# Patient Record
Sex: MALE | Race: WHITE | Employment: OTHER | URBAN - METROPOLITAN AREA
[De-identification: names, ages, dates, MRNs, and addresses within clinical notes are randomized per-mention and may not be internally consistent; named-entity substitution may affect disease eponyms.]

---

## 2017-07-11 PROBLEM — I27.20 PULMONARY HYPERTENSION (HCC): Status: ACTIVE | Noted: 2017-07-11

## 2018-07-24 PROBLEM — R93.1 ABNORMAL ECHOCARDIOGRAM: Status: ACTIVE | Noted: 2018-07-24

## 2018-07-24 PROBLEM — I51.7 LVH (LEFT VENTRICULAR HYPERTROPHY): Status: ACTIVE | Noted: 2018-07-24

## 2018-07-24 PROBLEM — I51.7 RIGHT ATRIAL ENLARGEMENT: Status: ACTIVE | Noted: 2018-07-24

## 2019-08-08 ENCOUNTER — HOSPITAL ENCOUNTER (OUTPATIENT)
Dept: RADIATION ONCOLOGY | Age: 74
Discharge: HOME | End: 2019-08-08
Payer: MEDICARE

## 2019-08-08 DIAGNOSIS — C61 PROSTATE CANCER (HCC): ICD-10-CM

## 2019-08-08 PROCEDURE — 99213 OFFICE O/P EST LOW 20 MIN: CPT | Performed by: RADIOLOGY

## 2019-08-08 NOTE — CONSULTS
Avi Leon M.D. Stefan Garcia. Agatha Murcia, Ph.D., M.D., Tanisha Pollock M.D. Corey Yousif, Ph.D., M.D. Gurpreet Machado M.D.       2019    Patient: Lobito Montero   YOB: 1945       Dear Dr Clotilde Pace:    Thank you for referring Lobito Montero to me for evaluation. Below are the relevant portions of my assessment and plan of care. If you have questions, please do not hesitate to call me. I look forward to following this patient along with you. Sincerely,      Electronically signed by Gurpreet Machado MD on 19 at 5:54 PM      CC: Patient Care Team:  Dominic Cody MD as PCP - General (Hematology and Oncology)          RADIATION ONCOLOGY NEW PATIENT ASSESSMENT    Date of Service: 2019    Location:  [facility] Radiation Oncology,   [facility][note 3] [facility]   [phone]     Patient ID:   Lobito Montero  : 1945   MRN: [id number]    Lobito Montero is being seen today for an oncologic opinion at the request of Dr. Clotilde Pace.     Chief Complaint:  Cancer Staging  Prostate cancer [facility])  Staging form: Prostate, AJCC 8th Edition  - Clinical: No stage assigned - Unsigned  - Pathologic stage from 2019: Stage TANJA (pT3b, pN1, cM0, PSA: 19.3, Grade Group: 5) - Signed by Gurpreet Machado MD on 2019    Patient Active Problem List   Diagnosis Code    Coronary artery disease involving native heart without angina pectoris I25.10    PVD (peripheral vascular disease) (HCC) I73.9    Hyperlipidemia E78.5    Essential hypertension I10    Family history of abdominal aortic aneurysm Z82.49    Abdominal aortic aneurysm without rupture [facility] I71.4    Non-rheumatic tricuspid valve insufficiency I36.1    Non-rheumatic mitral regurgitation I34.0    Pulmonary hypertension (HCC) I27.20    Abnormal echocardiogram R93.1    Right atrial enlargement I51.7    LVH (left ventricular hypertrophy) I51.7    Prostate cancer ([facility] C61 0.05, May 2018 a PSA of 0.29, October 4, 2018 a PSA of 0.72, February 2019 a PSA of 1.92 5/23/2019 a PSA of 3.96. The patient had repeat imaging included a bone scan on 8/5/2019 which was negative. A CT of the abdomen and pelvis on the same date documented a 2.6 cm nodule in the lower pelvis together with an enlarged lymph node in the left iliac chain. He has started Casodex under the care of medical oncology with Melanie to follow. I do agree with  proceeding with an Auxmin PET. I agreed that the patient will benefit from a course of external beam radiation therapy to the pelvis and prostatic fossa. My plan is to increase that the boostt dose to the prostatic fossa to close to definitive therapy doses. I will also contact the interventional radiologist about the feasibility of up with thin fiducial markers in this nodule. Therefore the fiducials will be placed prior to the patient's simulation. We will therefore range for these procedures to be performed when the patient returns from his vacation. Electronically signed by Talbot Gottron, MD on 8/8/2019 at 5:54 PM  [address]    CC:  Patient Care Team:  Pasha Shepherd MD as PCP - General (Hematology and Oncology)     Drugs Prescribed:  New Prescriptions    No medications on file       Orders Placed:   No orders of the defined types were placed in this encounter.

## 2019-08-08 NOTE — PROGRESS NOTES
Referring Physician: [facility]      There were no vitals filed for this visit. :                Wt Readings from Last 1 Encounters:   07/26/19 216 lb (98 kg)        There is no height or weight on file to calculate BMI. Immunizations:    Influenza status:    [x]   Current   []   Patient declined    Pneumococcal status:  [x]   Current  []   Patient declined    Smoking Status:    [] Smoker - PPD:   [] Nonsmoker - Quit Date:               [x] Never a smoker       No chief complaint on file. Cancer Staging  No matching staging information was found for the patient. Prior Radiation Therapy? No   If yes, site treated:   Facility:                             Date:    Concurrent Chemo/radiation? No   If yes, start date:    Prior Chemotherapy? No   If yes    Facility:                             Date:    Prior Hormonal Therapy? Yes, lupron If yes   [facility]ility: [facility]                       Date: 2015    Head and Neck Cancer? No   If yes, please remind physician to place swallow study order and speech therapy order. Pacemaker/Defibulator/ICD:  No              Current Outpatient Medications   Medication Sig Dispense Refill    carvedilol (COREG) 3.125 MG tablet Take 1 tablet by mouth 2 times daily 180 tablet 3    amLODIPine (NORVASC) 5 MG tablet Take 1 tablet by mouth daily 90 tablet 3    clopidogrel (PLAVIX) 75 MG tablet Take 1 tablet by mouth daily 90 tablet 3    losartan (COZAAR) 25 MG tablet Take 1 tablet by mouth daily 90 tablet 3    rosuvastatin (CRESTOR) 5 MG tablet take 1 tablet by mouth once daily 90 tablet 3    Calcium-Magnesium 200-50 MG TABS Take 3 tablets by mouth daily      Coenzyme Q10 (EQL COQ10) 300 MG CAPS Take by mouth      Cholecalciferol (VITAMIN D3) 73952 UNITS CAPS Take by mouth      aspirin 81 MG tablet Take 81 mg by mouth daily       No current facility-administered medications for this encounter.         Past Medical History:   Diagnosis Date    Coronary atherosclerosis of unspecified type of vessel, native or graft 8/13    NASREEN LCx, occl PDA, 70% Diag - med Rx    Kidney stone 11/2016    Malignant neoplasm of prostate ([address]     Occlusion and stenosis of carotid artery without mention of cerebral infarction 9/13    R CEA    Other and unspecified hyperlipidemia        Past Surgical History:   Procedure Laterality Date    CAROTID ENDARTERECTOMY Right 9/13    CAROTID ENDARTERECTOMY Left 09/2016    CORONARY ANGIOPLASTY WITH STENT PLACEMENT  8/13    NASREEN LCx, occl PDA, 70% Diag    HERNIA REPAIR Right     x 2    LITHOTRIPSY  11/2016    PROSTATECTOMY  11/14       Family History   Problem Relation Age of Onset    Coronary Art Dis Other     Other Father         AAA       Social History     Socioeconomic History    Marital status:      Spouse name: Not on file    Number of children: Not on file    Years of education: Not on file    Highest education level: Not on file   Occupational History    Not on file   Social Needs    Financial resource strain: Not on file    Food insecurity:     Worry: Not on file     Inability: Not on file    Transportation needs:     Medical: Not on file     Non-medical: Not on file   Tobacco Use    Smoking status: Never Smoker    Smokeless tobacco: Never Used   Substance and Sexual Activity    Alcohol use: Yes     Comment: occasional     Drug use: No    Sexual activity: Not on file   Lifestyle    Physical activity:     Days per week: Not on file     Minutes per session: Not on file    Stress: Not on file   Relationships    Social connections:     Talks on phone: Not on file     Gets together: Not on file     Attends Anabaptist service: Not on file     Active member of club or organization: Not on file     Attends meetings of clubs or organizations: Not on file     Relationship status: Not on file    Intimate partner violence:     Fear of current or ex partner: Not on file     Emotionally abused: Not on file patient/family/caregiver on falls prevention     7 or   Higher High Risk 1. Place patient in easily observable treatment room  2. Patient attended at all times by family member or staff  3. Provide assistance as indicated for ambulation activities  4. Reorient confused/cognitively impaired patient  5. Call-light/bell within patient's reach  6. Chair/bed in low position, stretcher/bed with siderails up except when performing patient care activities  7. Educate patient/family/caregiver on falls prevention             Assessment/Plan: Patient was seen today for consultation. Dr. Faith Minaya notified and examined patient. Dr. Faith Minaya needs to speak to IR. Pt going to [facility] and will follow up after returning from vacation.          Helen Henry 8/8/2019 1:11 PM

## 2019-08-09 ENCOUNTER — TELEPHONE (OUTPATIENT)
Dept: RADIATION ONCOLOGY | Age: 74
End: 2019-08-09

## 2019-08-09 NOTE — TELEPHONE ENCOUNTER
Pt was seen in consult on 8/8/19 new order placed 8/9/19 for Axumin pet/ct. Pt prefers to use [facility] if services are available. I called [facility] was transferred to [phone] to schedule. Axumin scans only done on Wednesday @330p. Pt will be out of town for a few weeks returning arund 8/28. Pt scheduled for Axumin on 9/4/19 @330p with arrival time 3p @ [facility]. Order and records faxed to [phone] with confirmation. Pt notified with above information.

## 2019-08-29 ENCOUNTER — TELEPHONE (OUTPATIENT)
Dept: INTERVENTIONAL RADIOLOGY/VASCULAR | Age: 74
End: 2019-08-29

## 2019-08-29 ENCOUNTER — TELEPHONE (OUTPATIENT)
Dept: RADIATION ONCOLOGY | Age: 74
End: 2019-08-29

## 2019-09-03 ENCOUNTER — TELEPHONE (OUTPATIENT)
Dept: RADIATION ONCOLOGY | Age: 74
End: 2019-09-03

## 2019-09-06 DIAGNOSIS — C61 PROSTATE CANCER (HCC): ICD-10-CM

## 2019-09-10 ENCOUNTER — HOSPITAL ENCOUNTER (OUTPATIENT)
Dept: ULTRASOUND IMAGING | Age: 74
Discharge: HOME | End: 2019-09-12
Payer: MEDICARE

## 2019-09-10 ENCOUNTER — HOSPITAL ENCOUNTER (OUTPATIENT)
Dept: ULTRASOUND IMAGING | Age: 74
End: 2019-09-10
Payer: MEDICARE

## 2019-09-10 VITALS
BODY MASS INDEX: 28.44 KG/M2 | SYSTOLIC BLOOD PRESSURE: 158 MMHG | RESPIRATION RATE: 14 BRPM | HEART RATE: 53 BPM | HEIGHT: 72 IN | TEMPERATURE: 95.7 F | DIASTOLIC BLOOD PRESSURE: 83 MMHG | WEIGHT: 210 LBS | OXYGEN SATURATION: 99 %

## 2019-09-10 DIAGNOSIS — C61 PROSTATE CANCER (HCC): ICD-10-CM

## 2019-09-10 DIAGNOSIS — C61 PROSTATE CA (HCC): ICD-10-CM

## 2019-09-10 LAB
INR BLD: 1
PARTIAL THROMBOPLASTIN TIME: 28.8 SEC (ref 24–36)
PLATELET # BLD: 262 K/UL (ref 150–450)
PROTHROMBIN TIME: 12.8 SEC (ref 11.8–14.6)

## 2019-09-10 PROCEDURE — 85049 AUTOMATED PLATELET COUNT: CPT

## 2019-09-10 PROCEDURE — 76000 FLUOROSCOPY <1 HR PHYS/QHP: CPT

## 2019-09-10 PROCEDURE — 85610 PROTHROMBIN TIME: CPT

## 2019-09-10 PROCEDURE — 36415 COLL VENOUS BLD VENIPUNCTURE: CPT

## 2019-09-10 PROCEDURE — 85730 THROMBOPLASTIN TIME PARTIAL: CPT

## 2019-09-10 RX ORDER — VITAMIN E 268 MG
600 CAPSULE ORAL DAILY
COMMUNITY

## 2019-09-10 ASSESSMENT — PAIN - FUNCTIONAL ASSESSMENT: PAIN_FUNCTIONAL_ASSESSMENT: 0-10

## 2019-09-18 ENCOUNTER — HOSPITAL ENCOUNTER (OUTPATIENT)
Dept: RADIATION ONCOLOGY | Age: 74
Discharge: HOME | End: 2019-09-18
Attending: RADIOLOGY
Payer: MEDICARE

## 2019-09-18 ENCOUNTER — HOSPITAL ENCOUNTER (OUTPATIENT)
Dept: RADIATION ONCOLOGY | Age: 74
Discharge: HOME | End: 2019-09-18
Payer: MEDICARE

## 2019-09-18 VITALS
SYSTOLIC BLOOD PRESSURE: 157 MMHG | DIASTOLIC BLOOD PRESSURE: 75 MMHG | TEMPERATURE: 98 F | HEART RATE: 52 BPM | OXYGEN SATURATION: 98 % | RESPIRATION RATE: 18 BRPM

## 2019-09-18 DIAGNOSIS — C61 PROSTATE CANCER (HCC): Primary | ICD-10-CM

## 2019-09-18 LAB
BUN BLDV-MCNC: 25 MG/DL (ref 8–23)
CREAT SERPL-MCNC: 1.36 MG/DL (ref 0.7–1.2)
GFR AFRICAN AMERICAN: >60 ML/MIN
GFR NON-AFRICAN AMERICAN: 51 ML/MIN
GFR SERPL CREATININE-BSD FRML MDRD: ABNORMAL ML/MIN/{1.73_M2}
GFR SERPL CREATININE-BSD FRML MDRD: ABNORMAL ML/MIN/{1.73_M2}

## 2019-09-18 PROCEDURE — 84520 ASSAY OF UREA NITROGEN: CPT

## 2019-09-18 PROCEDURE — 82565 ASSAY OF CREATININE: CPT

## 2019-09-18 PROCEDURE — 99212 OFFICE O/P EST SF 10 MIN: CPT | Performed by: RADIOLOGY

## 2019-09-18 PROCEDURE — 77334 RADIATION TREATMENT AID(S): CPT | Performed by: RADIOLOGY

## 2019-09-18 PROCEDURE — 36415 COLL VENOUS BLD VENIPUNCTURE: CPT

## 2019-09-18 NOTE — PROGRESS NOTES
Hiren Nettles Imes  9/18/2019  9:21 AM      Vitals:    09/18/19 0920   BP: (!) 157/75   Pulse: 52   Resp: 18   Temp: 98 °F (36.7 °C)   SpO2: 98%    :  Patient Currently in Pain: Denies             Wt Readings from Last 1 Encounters:   09/10/19 210 lb (95.3 kg)                Current Outpatient Medications:     vitamin E 400 UNIT capsule, Take 600 Units by mouth daily Indications: took first dose yesterday, Disp: , Rfl:     carvedilol (COREG) 3.125 MG tablet, Take 1 tablet by mouth 2 times daily, Disp: 180 tablet, Rfl: 3    amLODIPine (NORVASC) 5 MG tablet, Take 1 tablet by mouth daily, Disp: 90 tablet, Rfl: 3    clopidogrel (PLAVIX) 75 MG tablet, Take 1 tablet by mouth daily, Disp: 90 tablet, Rfl: 3    losartan (COZAAR) 25 MG tablet, Take 1 tablet by mouth daily, Disp: 90 tablet, Rfl: 3    rosuvastatin (CRESTOR) 5 MG tablet, take 1 tablet by mouth once daily, Disp: 90 tablet, Rfl: 3    Calcium-Magnesium 200-50 MG TABS, Take 3 tablets by mouth daily, Disp: , Rfl:     Coenzyme Q10 (EQL COQ10) 300 MG CAPS, Take by mouth, Disp: , Rfl:     Cholecalciferol (VITAMIN D3) 54718 UNITS CAPS, Take by mouth, Disp: , Rfl:     aspirin 81 MG tablet, Take 81 mg by mouth daily, Disp: , Rfl:           FALLS RISK SCREEN  Instructions:  Assess the patient and enter the appropriate indicators that are present for fall risk identification. Total the numbers entered and assign a fall risk score from Table 2.  Reassess patient at a minimum every 12 weeks or with status change. Assessment   Date  9/18/2019     1. Mental Ability: confusion/cognitively impaired 0     2. Elimination Issues: incontinence, frequency 0       3. Ambulatory: use of assistive devices (walker, cane, off-loading devices),        attached to equipment (IV pole, oxygen) 0     4. Sensory Limitations: dizziness, vertigo, impaired vision 0     5. Age less than 65        0     6. Age 72 or greater 0     7.   Medication: diuretics, strong analgesics,

## 2019-09-18 NOTE — PROGRESS NOTES
hormones but no adjuvant radiation.     He continued follow-up with Dr. Jc Narayan. PSAs has been as follows: 1/18/2016 a PSA of 0.17, November 2017 a PSA of 0.05, May 2018 a PSA of 0.29, October 2018 a PSA of 0.72, February 2019 a PSA of 1.92 5/23/2019 a PSA of 3.96.     The patient had a a CT of the abdomen and pelvis on 8/5/2019 that documented a 2.6 cm nodule in the lower pelvis together with an enlarged left internal iliac lymph node. A bone scan on the same day was negative.     The patient did restart Casodex and Lupron. He proceeded with an approximate scan on 9/4/2019. This documented a focal uptake on the left side of the pelvis between the bladder and rectum. There were no other areas of uptake. Examination at the initial assessment did review a mass in the prostate bed and the patient was sent to IR or possible fiducial placement. After discussion with the radiologist the patient elected to forego the procedure. Today he is without complaints.    MEDICATIONS:    Current Outpatient Medications:     vitamin E 400 UNIT capsule, Take 600 Units by mouth daily Indications: took first dose yesterday, Disp: , Rfl:     carvedilol (COREG) 3.125 MG tablet, Take 1 tablet by mouth 2 times daily, Disp: 180 tablet, Rfl: 3    amLODIPine (NORVASC) 5 MG tablet, Take 1 tablet by mouth daily, Disp: 90 tablet, Rfl: 3    clopidogrel (PLAVIX) 75 MG tablet, Take 1 tablet by mouth daily, Disp: 90 tablet, Rfl: 3    losartan (COZAAR) 25 MG tablet, Take 1 tablet by mouth daily, Disp: 90 tablet, Rfl: 3    rosuvastatin (CRESTOR) 5 MG tablet, take 1 tablet by mouth once daily, Disp: 90 tablet, Rfl: 3    Calcium-Magnesium 200-50 MG TABS, Take 3 tablets by mouth daily, Disp: , Rfl:     Coenzyme Q10 (EQL COQ10) 300 MG CAPS, Take by mouth, Disp: , Rfl:     Cholecalciferol (VITAMIN D3) 79066 UNITS CAPS, Take by mouth, Disp: , Rfl:     aspirin 81 MG tablet, Take 81 mg by mouth daily, Disp: , Rfl:     ALLERGIES:  Allergies Allergen Reactions    Lipitor [Atorvastatin] Other (See Comments)     Myalgias and arthralgias     Penicillins        IMMUNIZATIONS:    There is no immunization history on file for this patient. SMOKING:  Social History     Tobacco Use   Smoking Status Never Smoker   Smokeless Tobacco Never Used     Counseling given: Not Answered      Labs:  Platelets   Date Value Ref Range Status   09/10/2019 262 150 - 450 k/uL Final   Ana@TELOS results found for: PSALASTLAB(GLUCOSE,BUN,CREATININE,BUNCRER,CALCIUM,NA,K,CL,CO2,ANIONGAP,ALKPHOS,ALT,AST,BILITOT,PROT,LABALBU,ALBUMIN,LABGLOM,GFRAA,GFR)@    REVIEW OF SYSTEMS:    Review of Systems    PHYSICAL EXAMINATION:  CHAPERONE: Not Required  ECO Asymptomatic  VITAL SIGNS: BP (!) 157/75   Pulse 52   Temp 98 °F (36.7 °C) (Oral)   Resp 18   SpO2 98%   Wt Readings from Last 3 Encounters:   09/10/19 210 lb (95.3 kg)   19 216 lb (98 kg)   01/15/19 227 lb (103 kg)     Physical Exam  The patient is in no acute distress, oriented in time, person and place. Mucous membrane is pink, moist and anicteric. Abdomen is soft and nontender. Bowel sounds intact. ASSESSMENT AND PLAN:  The patient is a 66 old gentleman with a diagnosis of an adenocarcinoma the prostate status post prostatectomy in . The preop PSA was 19.3. The Milady score at the time of prostatectomy was 5+4. The pathological stage was pT3b N1 M0. There were extensive involvement of both seminal vesicle, extraprostatic extension, lymphovascular space invasion, negative margins, of 19 a right limp node positive and 5 left lymph nodes all negative. The patient had adjuvant hormones but no radiation therapy. The patient did have a PSA shannan in 2017 of 0.05. He subsequently had a slowly rising in the PSA with the most recent PSA in May 2019 of 3.96. A CT of the abdomen and pelvis documented a 2.6 cm mass in the low pelvis with a left internal iliac lymph node.   An approximate scan that documented uptake on the left side of the pelvis between the bladder and rectum. Examination did reveal a nodule in the prostate bed. The patient was sent to IR for possible fiducial placement but after discussion with the radiologist the patient elected to forego the procedure. He is here today to proceed with a CT simulation. The plan is to treat the prostate and seminal vesicular bed together with the pelvis to be followed by a boost to the prostate and seminal vesicular bed. An informed consent was obtained.     Electronically signed by Suzette Dalton MD on 9/18/2019 at 10:27 [address] [place] [address]       Medications Prescribed:   New Prescriptions    No medications on file       Orders:   Orders Placed This Encounter   Procedures    BUN    Creatinine, Serum    Creatinine, Serum    BUN

## 2019-09-25 ENCOUNTER — TELEPHONE (OUTPATIENT)
Dept: ONCOLOGY | Age: 74
End: 2019-09-25

## 2019-09-27 ENCOUNTER — HOSPITAL ENCOUNTER (OUTPATIENT)
Dept: RADIATION ONCOLOGY | Age: 74
Discharge: HOME | End: 2019-09-27
Attending: RADIOLOGY
Payer: MEDICARE

## 2019-09-27 PROCEDURE — 77300 RADIATION THERAPY DOSE PLAN: CPT | Performed by: RADIOLOGY

## 2019-09-27 PROCEDURE — 77338 DESIGN MLC DEVICE FOR IMRT: CPT | Performed by: RADIOLOGY

## 2019-09-27 PROCEDURE — 77301 RADIOTHERAPY DOSE PLAN IMRT: CPT | Performed by: RADIOLOGY

## 2019-10-03 ENCOUNTER — HOSPITAL ENCOUNTER (OUTPATIENT)
Dept: RADIATION ONCOLOGY | Age: 74
End: 2019-10-03
Attending: RADIOLOGY
Payer: MEDICARE

## 2019-10-08 ENCOUNTER — HOSPITAL ENCOUNTER (OUTPATIENT)
Dept: RADIATION ONCOLOGY | Age: 74
Discharge: HOME | End: 2019-10-08
Attending: RADIOLOGY
Payer: MEDICARE

## 2019-10-08 ENCOUNTER — APPOINTMENT (OUTPATIENT)
Dept: RADIATION ONCOLOGY | Age: 74
End: 2019-10-08
Attending: RADIOLOGY
Payer: MEDICARE

## 2019-10-08 VITALS
HEART RATE: 47 BPM | SYSTOLIC BLOOD PRESSURE: 158 MMHG | RESPIRATION RATE: 18 BRPM | DIASTOLIC BLOOD PRESSURE: 94 MMHG | TEMPERATURE: 98.1 F | OXYGEN SATURATION: 94 %

## 2019-10-08 PROCEDURE — 77385 HC NTSTY MODUL RAD TX DLVR SMPL: CPT | Performed by: RADIOLOGY

## 2019-10-08 PROCEDURE — 99211 OFF/OP EST MAY X REQ PHY/QHP: CPT | Performed by: RADIOLOGY

## 2019-10-09 ENCOUNTER — HOSPITAL ENCOUNTER (OUTPATIENT)
Dept: RADIATION ONCOLOGY | Age: 74
Discharge: HOME | End: 2019-10-09
Attending: RADIOLOGY
Payer: MEDICARE

## 2019-10-09 VITALS
BODY MASS INDEX: 30.03 KG/M2 | SYSTOLIC BLOOD PRESSURE: 166 MMHG | WEIGHT: 221.4 LBS | OXYGEN SATURATION: 98 % | DIASTOLIC BLOOD PRESSURE: 80 MMHG | HEART RATE: 50 BPM | TEMPERATURE: 97.2 F | RESPIRATION RATE: 16 BRPM

## 2019-10-09 PROCEDURE — 77385 HC NTSTY MODUL RAD TX DLVR SMPL: CPT | Performed by: RADIOLOGY

## 2019-10-10 ENCOUNTER — HOSPITAL ENCOUNTER (OUTPATIENT)
Dept: RADIATION ONCOLOGY | Age: 74
Discharge: HOME | End: 2019-10-10
Attending: RADIOLOGY
Payer: MEDICARE

## 2019-10-10 PROCEDURE — 77385 HC NTSTY MODUL RAD TX DLVR SMPL: CPT | Performed by: RADIOLOGY

## 2019-10-11 ENCOUNTER — HOSPITAL ENCOUNTER (OUTPATIENT)
Dept: RADIATION ONCOLOGY | Age: 74
Discharge: HOME | End: 2019-10-11
Attending: RADIOLOGY
Payer: MEDICARE

## 2019-10-11 PROCEDURE — 77385 HC NTSTY MODUL RAD TX DLVR SMPL: CPT | Performed by: RADIOLOGY

## 2019-10-14 ENCOUNTER — HOSPITAL ENCOUNTER (OUTPATIENT)
Dept: RADIATION ONCOLOGY | Age: 74
Discharge: HOME | End: 2019-10-14
Attending: RADIOLOGY
Payer: MEDICARE

## 2019-10-14 PROCEDURE — 77385 HC NTSTY MODUL RAD TX DLVR SMPL: CPT | Performed by: RADIOLOGY

## 2019-10-15 ENCOUNTER — HOSPITAL ENCOUNTER (OUTPATIENT)
Dept: RADIATION ONCOLOGY | Age: 74
Discharge: HOME | End: 2019-10-15
Attending: RADIOLOGY
Payer: MEDICARE

## 2019-10-15 PROCEDURE — 77336 RADIATION PHYSICS CONSULT: CPT | Performed by: RADIOLOGY

## 2019-10-15 PROCEDURE — 77385 HC NTSTY MODUL RAD TX DLVR SMPL: CPT | Performed by: RADIOLOGY

## 2019-10-16 ENCOUNTER — HOSPITAL ENCOUNTER (OUTPATIENT)
Dept: RADIATION ONCOLOGY | Age: 74
Discharge: HOME | End: 2019-10-16
Attending: RADIOLOGY
Payer: MEDICARE

## 2019-10-16 VITALS
WEIGHT: 220.5 LBS | RESPIRATION RATE: 18 BRPM | SYSTOLIC BLOOD PRESSURE: 145 MMHG | BODY MASS INDEX: 29.91 KG/M2 | DIASTOLIC BLOOD PRESSURE: 80 MMHG | TEMPERATURE: 97.7 F | HEART RATE: 55 BPM | OXYGEN SATURATION: 97 %

## 2019-10-16 PROCEDURE — 77385 HC NTSTY MODUL RAD TX DLVR SMPL: CPT | Performed by: RADIOLOGY

## 2019-10-17 ENCOUNTER — HOSPITAL ENCOUNTER (OUTPATIENT)
Dept: RADIATION ONCOLOGY | Age: 74
Discharge: HOME | End: 2019-10-17
Attending: RADIOLOGY
Payer: MEDICARE

## 2019-10-17 PROCEDURE — 77385 HC NTSTY MODUL RAD TX DLVR SMPL: CPT | Performed by: RADIOLOGY

## 2019-10-18 ENCOUNTER — HOSPITAL ENCOUNTER (OUTPATIENT)
Dept: RADIATION ONCOLOGY | Age: 74
Discharge: HOME | End: 2019-10-18
Attending: RADIOLOGY
Payer: MEDICARE

## 2019-10-18 DIAGNOSIS — C61 PROSTATE CANCER (HCC): Primary | ICD-10-CM

## 2019-10-18 PROCEDURE — 77385 HC NTSTY MODUL RAD TX DLVR SMPL: CPT | Performed by: RADIOLOGY

## 2019-10-21 ENCOUNTER — HOSPITAL ENCOUNTER (OUTPATIENT)
Age: 74
Discharge: HOME | End: 2019-10-21
Attending: RADIOLOGY
Payer: MEDICARE

## 2019-10-21 ENCOUNTER — HOSPITAL ENCOUNTER (OUTPATIENT)
Dept: RADIATION ONCOLOGY | Age: 74
Discharge: HOME | End: 2019-10-21
Attending: RADIOLOGY
Payer: MEDICARE

## 2019-10-21 DIAGNOSIS — C61 PROSTATE CANCER (HCC): ICD-10-CM

## 2019-10-21 LAB
ALBUMIN SERPL-MCNC: 4.7 G/DL (ref 3.5–5.2)
ALBUMIN/GLOBULIN RATIO: 1.6 (ref 1–2.5)
ALP BLD-CCNC: 64 U/L (ref 40–129)
ALT SERPL-CCNC: 13 U/L (ref 5–41)
AST SERPL-CCNC: 16 U/L
BILIRUB SERPL-MCNC: 0.72 MG/DL (ref 0.3–1.2)
BILIRUBIN DIRECT: 0.13 MG/DL
BILIRUBIN, INDIRECT: 0.59 MG/DL (ref 0–1)
GLOBULIN: NORMAL G/DL (ref 1.5–3.8)
TOTAL PROTEIN: 7.6 G/DL (ref 6.4–8.3)

## 2019-10-21 PROCEDURE — 36415 COLL VENOUS BLD VENIPUNCTURE: CPT

## 2019-10-21 PROCEDURE — 77385 HC NTSTY MODUL RAD TX DLVR SMPL: CPT | Performed by: RADIOLOGY

## 2019-10-21 PROCEDURE — 80076 HEPATIC FUNCTION PANEL: CPT

## 2019-10-22 ENCOUNTER — HOSPITAL ENCOUNTER (OUTPATIENT)
Dept: RADIATION ONCOLOGY | Age: 74
Discharge: HOME | End: 2019-10-22
Attending: RADIOLOGY
Payer: MEDICARE

## 2019-10-22 PROCEDURE — 77385 HC NTSTY MODUL RAD TX DLVR SMPL: CPT | Performed by: RADIOLOGY

## 2019-10-23 ENCOUNTER — HOSPITAL ENCOUNTER (OUTPATIENT)
Dept: RADIATION ONCOLOGY | Age: 74
Discharge: HOME | End: 2019-10-23
Attending: RADIOLOGY
Payer: MEDICARE

## 2019-10-23 VITALS
SYSTOLIC BLOOD PRESSURE: 145 MMHG | TEMPERATURE: 97.2 F | WEIGHT: 221.8 LBS | HEART RATE: 65 BPM | RESPIRATION RATE: 14 BRPM | DIASTOLIC BLOOD PRESSURE: 82 MMHG | OXYGEN SATURATION: 98 % | BODY MASS INDEX: 30.08 KG/M2

## 2019-10-23 PROCEDURE — 77336 RADIATION PHYSICS CONSULT: CPT | Performed by: RADIOLOGY

## 2019-10-23 PROCEDURE — 77385 HC NTSTY MODUL RAD TX DLVR SMPL: CPT | Performed by: RADIOLOGY

## 2019-10-24 ENCOUNTER — HOSPITAL ENCOUNTER (OUTPATIENT)
Dept: RADIATION ONCOLOGY | Age: 74
Discharge: HOME | End: 2019-10-24
Attending: RADIOLOGY
Payer: MEDICARE

## 2019-10-24 PROCEDURE — 77385 HC NTSTY MODUL RAD TX DLVR SMPL: CPT | Performed by: RADIOLOGY

## 2019-10-25 ENCOUNTER — HOSPITAL ENCOUNTER (OUTPATIENT)
Dept: RADIATION ONCOLOGY | Age: 74
Discharge: HOME | End: 2019-10-25
Attending: RADIOLOGY
Payer: MEDICARE

## 2019-10-25 PROCEDURE — 77385 HC NTSTY MODUL RAD TX DLVR SMPL: CPT | Performed by: RADIOLOGY

## 2019-10-28 ENCOUNTER — HOSPITAL ENCOUNTER (OUTPATIENT)
Dept: RADIATION ONCOLOGY | Age: 74
Discharge: HOME | End: 2019-10-28
Attending: RADIOLOGY
Payer: MEDICARE

## 2019-10-28 PROCEDURE — 77385 HC NTSTY MODUL RAD TX DLVR SMPL: CPT | Performed by: RADIOLOGY

## 2019-10-29 ENCOUNTER — HOSPITAL ENCOUNTER (OUTPATIENT)
Dept: RADIATION ONCOLOGY | Age: 74
Discharge: HOME | End: 2019-10-29
Attending: RADIOLOGY
Payer: MEDICARE

## 2019-10-29 PROCEDURE — 77385 HC NTSTY MODUL RAD TX DLVR SMPL: CPT | Performed by: RADIOLOGY

## 2019-10-30 ENCOUNTER — HOSPITAL ENCOUNTER (OUTPATIENT)
Dept: RADIATION ONCOLOGY | Age: 74
Discharge: HOME | End: 2019-10-30
Attending: RADIOLOGY
Payer: MEDICARE

## 2019-10-30 VITALS
HEART RATE: 55 BPM | SYSTOLIC BLOOD PRESSURE: 154 MMHG | BODY MASS INDEX: 29.57 KG/M2 | DIASTOLIC BLOOD PRESSURE: 73 MMHG | RESPIRATION RATE: 18 BRPM | TEMPERATURE: 98.6 F | WEIGHT: 218 LBS | OXYGEN SATURATION: 95 %

## 2019-10-30 PROCEDURE — 77336 RADIATION PHYSICS CONSULT: CPT | Performed by: RADIOLOGY

## 2019-10-30 PROCEDURE — 77385 HC NTSTY MODUL RAD TX DLVR SMPL: CPT | Performed by: RADIOLOGY

## 2019-10-31 ENCOUNTER — HOSPITAL ENCOUNTER (OUTPATIENT)
Dept: RADIATION ONCOLOGY | Age: 74
Discharge: HOME | End: 2019-10-31
Attending: RADIOLOGY
Payer: MEDICARE

## 2019-10-31 PROCEDURE — 77385 HC NTSTY MODUL RAD TX DLVR SMPL: CPT | Performed by: RADIOLOGY

## 2019-11-01 ENCOUNTER — HOSPITAL ENCOUNTER (OUTPATIENT)
Dept: RADIATION ONCOLOGY | Age: 74
Discharge: HOME | End: 2019-11-01
Attending: RADIOLOGY
Payer: MEDICARE

## 2019-11-01 PROCEDURE — 77338 DESIGN MLC DEVICE FOR IMRT: CPT | Performed by: RADIOLOGY

## 2019-11-01 PROCEDURE — 77385 HC NTSTY MODUL RAD TX DLVR SMPL: CPT | Performed by: RADIOLOGY

## 2019-11-04 ENCOUNTER — HOSPITAL ENCOUNTER (OUTPATIENT)
Dept: RADIATION ONCOLOGY | Age: 74
Discharge: HOME | End: 2019-11-04
Attending: RADIOLOGY
Payer: MEDICARE

## 2019-11-04 PROCEDURE — 77385 HC NTSTY MODUL RAD TX DLVR SMPL: CPT | Performed by: RADIOLOGY

## 2019-11-05 ENCOUNTER — HOSPITAL ENCOUNTER (OUTPATIENT)
Dept: RADIATION ONCOLOGY | Age: 74
Discharge: HOME | End: 2019-11-05
Attending: RADIOLOGY
Payer: MEDICARE

## 2019-11-05 PROCEDURE — 77385 HC NTSTY MODUL RAD TX DLVR SMPL: CPT | Performed by: RADIOLOGY

## 2019-11-06 ENCOUNTER — HOSPITAL ENCOUNTER (OUTPATIENT)
Dept: RADIATION ONCOLOGY | Age: 74
Discharge: HOME | End: 2019-11-06
Attending: RADIOLOGY
Payer: MEDICARE

## 2019-11-06 VITALS
RESPIRATION RATE: 18 BRPM | HEART RATE: 54 BPM | WEIGHT: 217 LBS | BODY MASS INDEX: 29.43 KG/M2 | TEMPERATURE: 98 F | SYSTOLIC BLOOD PRESSURE: 145 MMHG | OXYGEN SATURATION: 100 % | DIASTOLIC BLOOD PRESSURE: 82 MMHG

## 2019-11-06 PROCEDURE — 77385 HC NTSTY MODUL RAD TX DLVR SMPL: CPT | Performed by: RADIOLOGY

## 2019-11-06 PROCEDURE — 77336 RADIATION PHYSICS CONSULT: CPT | Performed by: RADIOLOGY

## 2019-11-07 ENCOUNTER — HOSPITAL ENCOUNTER (OUTPATIENT)
Dept: RADIATION ONCOLOGY | Age: 74
Discharge: HOME | End: 2019-11-07
Attending: RADIOLOGY
Payer: MEDICARE

## 2019-11-07 PROCEDURE — 77385 HC NTSTY MODUL RAD TX DLVR SMPL: CPT | Performed by: RADIOLOGY

## 2019-11-08 ENCOUNTER — HOSPITAL ENCOUNTER (OUTPATIENT)
Dept: RADIATION ONCOLOGY | Age: 74
Discharge: HOME | End: 2019-11-08
Attending: RADIOLOGY
Payer: MEDICARE

## 2019-11-08 ENCOUNTER — HOSPITAL ENCOUNTER (OUTPATIENT)
Dept: RADIATION ONCOLOGY | Age: 74
End: 2019-11-08
Attending: RADIOLOGY
Payer: MEDICARE

## 2019-11-08 PROCEDURE — 77385 HC NTSTY MODUL RAD TX DLVR SMPL: CPT | Performed by: RADIOLOGY

## 2019-11-11 ENCOUNTER — HOSPITAL ENCOUNTER (OUTPATIENT)
Dept: RADIATION ONCOLOGY | Age: 74
Discharge: HOME | End: 2019-11-11
Attending: RADIOLOGY
Payer: MEDICARE

## 2019-11-11 ENCOUNTER — APPOINTMENT (OUTPATIENT)
Dept: RADIATION ONCOLOGY | Age: 74
End: 2019-11-11
Attending: RADIOLOGY
Payer: MEDICARE

## 2019-11-11 PROCEDURE — 77385 HC NTSTY MODUL RAD TX DLVR SMPL: CPT | Performed by: RADIOLOGY

## 2019-11-12 ENCOUNTER — HOSPITAL ENCOUNTER (OUTPATIENT)
Dept: RADIATION ONCOLOGY | Age: 74
Discharge: HOME | End: 2019-11-12
Attending: RADIOLOGY
Payer: MEDICARE

## 2019-11-12 PROCEDURE — 77385 HC NTSTY MODUL RAD TX DLVR SMPL: CPT | Performed by: RADIOLOGY

## 2019-11-13 ENCOUNTER — HOSPITAL ENCOUNTER (OUTPATIENT)
Dept: RADIATION ONCOLOGY | Age: 74
Discharge: HOME | End: 2019-11-13
Attending: RADIOLOGY
Payer: MEDICARE

## 2019-11-13 PROCEDURE — 77300 RADIATION THERAPY DOSE PLAN: CPT | Performed by: RADIOLOGY

## 2019-11-13 PROCEDURE — 77336 RADIATION PHYSICS CONSULT: CPT | Performed by: RADIOLOGY

## 2019-11-13 PROCEDURE — 77385 HC NTSTY MODUL RAD TX DLVR SMPL: CPT | Performed by: RADIOLOGY

## 2019-11-13 PROCEDURE — 77338 DESIGN MLC DEVICE FOR IMRT: CPT | Performed by: RADIOLOGY

## 2019-11-14 ENCOUNTER — HOSPITAL ENCOUNTER (OUTPATIENT)
Dept: RADIATION ONCOLOGY | Age: 74
Discharge: HOME | End: 2019-11-14
Attending: RADIOLOGY
Payer: MEDICARE

## 2019-11-14 PROCEDURE — 77385 HC NTSTY MODUL RAD TX DLVR SMPL: CPT | Performed by: RADIOLOGY

## 2019-11-15 ENCOUNTER — APPOINTMENT (OUTPATIENT)
Dept: RADIATION ONCOLOGY | Age: 74
End: 2019-11-15
Attending: RADIOLOGY
Payer: MEDICARE

## 2019-11-15 ENCOUNTER — HOSPITAL ENCOUNTER (OUTPATIENT)
Dept: RADIATION ONCOLOGY | Age: 74
Discharge: HOME | End: 2019-11-15
Attending: RADIOLOGY
Payer: MEDICARE

## 2019-11-15 PROCEDURE — 77385 HC NTSTY MODUL RAD TX DLVR SMPL: CPT | Performed by: RADIOLOGY

## 2019-11-18 ENCOUNTER — HOSPITAL ENCOUNTER (OUTPATIENT)
Dept: RADIATION ONCOLOGY | Age: 74
Discharge: HOME | End: 2019-11-18
Attending: RADIOLOGY
Payer: MEDICARE

## 2019-11-18 PROCEDURE — 77385 HC NTSTY MODUL RAD TX DLVR SMPL: CPT | Performed by: RADIOLOGY

## 2019-11-19 ENCOUNTER — HOSPITAL ENCOUNTER (OUTPATIENT)
Dept: RADIATION ONCOLOGY | Age: 74
Discharge: HOME | End: 2019-11-19
Attending: RADIOLOGY
Payer: MEDICARE

## 2019-11-19 PROCEDURE — 77385 HC NTSTY MODUL RAD TX DLVR SMPL: CPT | Performed by: RADIOLOGY

## 2019-11-20 ENCOUNTER — HOSPITAL ENCOUNTER (OUTPATIENT)
Dept: RADIATION ONCOLOGY | Age: 74
Discharge: HOME | End: 2019-11-20
Attending: RADIOLOGY
Payer: MEDICARE

## 2019-11-20 VITALS
WEIGHT: 215 LBS | RESPIRATION RATE: 18 BRPM | OXYGEN SATURATION: 98 % | BODY MASS INDEX: 29.16 KG/M2 | HEART RATE: 48 BPM | SYSTOLIC BLOOD PRESSURE: 160 MMHG | TEMPERATURE: 98 F | DIASTOLIC BLOOD PRESSURE: 67 MMHG

## 2019-11-20 PROCEDURE — 77336 RADIATION PHYSICS CONSULT: CPT | Performed by: RADIOLOGY

## 2019-11-20 PROCEDURE — 77385 HC NTSTY MODUL RAD TX DLVR SMPL: CPT | Performed by: RADIOLOGY

## 2019-11-21 ENCOUNTER — HOSPITAL ENCOUNTER (OUTPATIENT)
Dept: RADIATION ONCOLOGY | Age: 74
Discharge: HOME | End: 2019-11-21
Attending: RADIOLOGY
Payer: MEDICARE

## 2019-11-21 PROCEDURE — 77385 HC NTSTY MODUL RAD TX DLVR SMPL: CPT | Performed by: RADIOLOGY

## 2019-11-22 ENCOUNTER — HOSPITAL ENCOUNTER (OUTPATIENT)
Dept: RADIATION ONCOLOGY | Age: 74
Discharge: HOME | End: 2019-11-22
Attending: RADIOLOGY
Payer: MEDICARE

## 2019-11-22 PROCEDURE — 77385 HC NTSTY MODUL RAD TX DLVR SMPL: CPT | Performed by: RADIOLOGY

## 2019-11-24 ENCOUNTER — HOSPITAL ENCOUNTER (OUTPATIENT)
Dept: RADIATION ONCOLOGY | Age: 74
Discharge: HOME | End: 2019-11-24
Attending: RADIOLOGY
Payer: MEDICARE

## 2019-11-24 PROCEDURE — 77385 HC NTSTY MODUL RAD TX DLVR SMPL: CPT | Performed by: RADIOLOGY

## 2019-11-25 ENCOUNTER — HOSPITAL ENCOUNTER (OUTPATIENT)
Dept: RADIATION ONCOLOGY | Age: 74
Discharge: HOME | End: 2019-11-25
Attending: RADIOLOGY
Payer: MEDICARE

## 2019-11-25 PROCEDURE — 77385 HC NTSTY MODUL RAD TX DLVR SMPL: CPT | Performed by: RADIOLOGY

## 2019-11-26 ENCOUNTER — HOSPITAL ENCOUNTER (OUTPATIENT)
Dept: RADIATION ONCOLOGY | Age: 74
Discharge: HOME | End: 2019-11-26
Attending: RADIOLOGY
Payer: MEDICARE

## 2019-11-26 PROCEDURE — 77336 RADIATION PHYSICS CONSULT: CPT | Performed by: RADIOLOGY

## 2019-11-26 PROCEDURE — 77385 HC NTSTY MODUL RAD TX DLVR SMPL: CPT | Performed by: RADIOLOGY

## 2019-11-27 ENCOUNTER — APPOINTMENT (OUTPATIENT)
Dept: RADIATION ONCOLOGY | Age: 74
End: 2019-11-27
Attending: RADIOLOGY
Payer: MEDICARE

## 2019-11-27 ENCOUNTER — HOSPITAL ENCOUNTER (OUTPATIENT)
Dept: RADIATION ONCOLOGY | Age: 74
Discharge: HOME | End: 2019-11-27
Attending: RADIOLOGY
Payer: MEDICARE

## 2019-11-27 VITALS
SYSTOLIC BLOOD PRESSURE: 151 MMHG | HEART RATE: 58 BPM | BODY MASS INDEX: 29.92 KG/M2 | WEIGHT: 220.6 LBS | DIASTOLIC BLOOD PRESSURE: 69 MMHG | OXYGEN SATURATION: 98 % | TEMPERATURE: 97.6 F | RESPIRATION RATE: 18 BRPM

## 2019-11-27 PROCEDURE — 77385 HC NTSTY MODUL RAD TX DLVR SMPL: CPT | Performed by: RADIOLOGY

## 2019-11-29 ENCOUNTER — APPOINTMENT (OUTPATIENT)
Dept: RADIATION ONCOLOGY | Age: 74
End: 2019-11-29
Attending: RADIOLOGY
Payer: MEDICARE

## 2019-12-02 ENCOUNTER — HOSPITAL ENCOUNTER (OUTPATIENT)
Dept: RADIATION ONCOLOGY | Age: 74
Discharge: HOME | End: 2019-12-02
Attending: RADIOLOGY
Payer: MEDICARE

## 2019-12-02 PROCEDURE — 77385 HC NTSTY MODUL RAD TX DLVR SMPL: CPT | Performed by: RADIOLOGY

## 2019-12-03 ENCOUNTER — HOSPITAL ENCOUNTER (OUTPATIENT)
Dept: RADIATION ONCOLOGY | Age: 74
Discharge: HOME | End: 2019-12-03
Attending: RADIOLOGY
Payer: MEDICARE

## 2019-12-03 PROCEDURE — 77385 HC NTSTY MODUL RAD TX DLVR SMPL: CPT | Performed by: RADIOLOGY

## 2019-12-04 ENCOUNTER — HOSPITAL ENCOUNTER (OUTPATIENT)
Dept: RADIATION ONCOLOGY | Age: 74
Discharge: HOME | End: 2019-12-04
Attending: RADIOLOGY
Payer: MEDICARE

## 2019-12-04 VITALS
RESPIRATION RATE: 18 BRPM | SYSTOLIC BLOOD PRESSURE: 140 MMHG | DIASTOLIC BLOOD PRESSURE: 87 MMHG | BODY MASS INDEX: 29.16 KG/M2 | OXYGEN SATURATION: 98 % | HEART RATE: 61 BPM | WEIGHT: 215 LBS | TEMPERATURE: 97.7 F

## 2019-12-04 PROCEDURE — 77385 HC NTSTY MODUL RAD TX DLVR SMPL: CPT | Performed by: RADIOLOGY

## 2019-12-04 PROCEDURE — 77336 RADIATION PHYSICS CONSULT: CPT | Performed by: RADIOLOGY

## 2019-12-05 ENCOUNTER — HOSPITAL ENCOUNTER (OUTPATIENT)
Dept: RADIATION ONCOLOGY | Age: 74
Discharge: HOME | End: 2019-12-05
Attending: RADIOLOGY
Payer: MEDICARE

## 2019-12-05 PROCEDURE — 77385 HC NTSTY MODUL RAD TX DLVR SMPL: CPT | Performed by: RADIOLOGY

## 2019-12-06 ENCOUNTER — HOSPITAL ENCOUNTER (OUTPATIENT)
Dept: RADIATION ONCOLOGY | Age: 74
Discharge: HOME | End: 2019-12-06
Attending: RADIOLOGY
Payer: MEDICARE

## 2019-12-06 PROCEDURE — 77385 HC NTSTY MODUL RAD TX DLVR SMPL: CPT | Performed by: RADIOLOGY

## 2019-12-09 PROCEDURE — 77336 RADIATION PHYSICS CONSULT: CPT | Performed by: RADIOLOGY

## 2019-12-13 ENCOUNTER — CLINICAL DOCUMENTATION (OUTPATIENT)
Dept: RADIATION ONCOLOGY | Age: 74
End: 2019-12-13

## 2020-01-07 ENCOUNTER — TELEPHONE (OUTPATIENT)
Dept: RADIATION ONCOLOGY | Age: 75
End: 2020-01-07

## 2020-01-16 ENCOUNTER — HOSPITAL ENCOUNTER (OUTPATIENT)
Dept: RADIATION ONCOLOGY | Age: 75
Discharge: HOME | End: 2020-01-16
Attending: RADIOLOGY
Payer: MEDICARE

## 2020-01-16 VITALS
OXYGEN SATURATION: 98 % | BODY MASS INDEX: 30.24 KG/M2 | RESPIRATION RATE: 18 BRPM | WEIGHT: 223 LBS | DIASTOLIC BLOOD PRESSURE: 78 MMHG | HEART RATE: 61 BPM | SYSTOLIC BLOOD PRESSURE: 128 MMHG | TEMPERATURE: 98 F

## 2020-01-16 PROCEDURE — 99212 OFFICE O/P EST SF 10 MIN: CPT | Performed by: RADIOLOGY

## 2020-01-16 NOTE — PROGRESS NOTES
[address]            Radiation Oncology          [address] [facility]          [place], [address]        Demi Blancas: [phone]        F: [phone]       [email or web address]         Date of Service: 2020     Location:  [address],   [address] [facility] [place]   [phone]        RADIATION ONCOLOGY FOLLOW UP NOTE    Patient ID:   Brady Luna  : 1945   MRN: [id number]    DIAGNOSIS:  Prostate adenocarcinoma s/p RP in 2014 at [facility] showing pT3b, pN1.  Patient received hormonal therapy after his surgery, however his PSA has been recently increasing. [facility] had a recent Axumin scan which showed FDG avidity in the prostatic bed fossa with no evidence of distant metastatic disease. Patient is recently completed a course of salvage radiotherapy with concurrent androgen deprivation therapy. He received 5040 cGy to the pelvic lymph nodes, 6660 cGy to the prostatic fossa, 7740 cGy to the FDG avid Axumin nodule in the prostate fossa. He is currently on hormonal therapy admitted to the medical oncology. INTERVAL HISTORY:   Patient presents to the radiation oncology clinic for his first posttreatment follow-up. He is doing well. His energy is improving. He reports occasional loose stools but diarrhea has resolved. He reports his urination is back to baseline. He denies any new complaints.     MEDICATIONS:    Current Outpatient Medications:     losartan (COZAAR) 25 MG tablet, take 1 tablet by mouth once daily, Disp: 90 tablet, Rfl: 3    clopidogrel (PLAVIX) 75 MG tablet, take 1 tablet by mouth once daily, Disp: 90 tablet, Rfl: 3    vitamin E 400 UNIT capsule, Take 600 Units by mouth daily Indications: took first dose yesterday, Disp: , Rfl:     carvedilol (COREG) 3.125 MG tablet, Take 1 tablet by mouth 2 times daily, Disp: 180 tablet, Rfl: 3    amLODIPine (NORVASC) 5 MG tablet, Take 1 tablet by mouth daily, Disp: 90 tablet, Rfl: 3   received 5040 cGy to the pelvic lymph nodes, 6660 cGy to the prostatic fossa, 7740 cGy to the FDG avid Axumin nodule in the prostate fossa. He is currently on hormonal therapy admitted to the medical oncology. Patient is recovering well since completing his radiation treatment. He will continue following up with medical oncology regarding systemic therapy. He is currently on Lupron and was recently started on Apalutamide. His PSA and will continue to be followed by medical oncology. He will come back to see us in the radiation oncology clinic in 6 months or sooner if clinically indicated. Patient was in agreement with my recommendations. All questions were answered to his satisfaction. Patient was advised to contact us anytime should he have any questions or concerns. Electronically signed by Margarette Patricio MD on 1/16/2020 at 10:22 AM        Medications Prescribed:   New Prescriptions    No medications on file       Orders: No orders of the defined types were placed in this encounter.       CC:  Patient Care Team:  Catracho Yang MD as PCP - General (Hematology and Oncology)

## 2020-01-16 NOTE — PROGRESS NOTES
Von Client Imes  1/16/2020  10:18 AM      Vitals:    01/16/20 1017   BP: 128/78   Pulse: 61   Resp: 18   Temp: 98 °F (36.7 °C)   SpO2: 98%    :  Patient Currently in Pain: Denies             Wt Readings from Last 1 Encounters:   01/16/20 223 lb (101.2 kg)                Current Outpatient Medications:     losartan (COZAAR) 25 MG tablet, take 1 tablet by mouth once daily, Disp: 90 tablet, Rfl: 3    clopidogrel (PLAVIX) 75 MG tablet, take 1 tablet by mouth once daily, Disp: 90 tablet, Rfl: 3    vitamin E 400 UNIT capsule, Take 600 Units by mouth daily Indications: took first dose yesterday, Disp: , Rfl:     carvedilol (COREG) 3.125 MG tablet, Take 1 tablet by mouth 2 times daily, Disp: 180 tablet, Rfl: 3    amLODIPine (NORVASC) 5 MG tablet, Take 1 tablet by mouth daily, Disp: 90 tablet, Rfl: 3    rosuvastatin (CRESTOR) 5 MG tablet, take 1 tablet by mouth once daily, Disp: 90 tablet, Rfl: 3    Calcium-Magnesium 200-50 MG TABS, Take 3 tablets by mouth daily, Disp: , Rfl:     Coenzyme Q10 (EQL COQ10) 300 MG CAPS, Take by mouth, Disp: , Rfl:     Cholecalciferol (VITAMIN D3) 81001 UNITS CAPS, Take by mouth, Disp: , Rfl:     aspirin 81 MG tablet, Take 81 mg by mouth daily, Disp: , Rfl:     Immunizations:    Influenza status:    [x]   Current   []   Patient declined    Pneumococcal status:  [x]   Current  []   Patient declined    Smoking Status:    [] Smoker - PPD:  Smoking cessation education: Provided []   Declined []    [] Nonsmoker - Quit Date:               [x] Never a smoker           Cancer Screening:  Colonoscopy [x] Current [] Not current   [] Not current, but scheduled   [] NA  Mammogram [] Current [] Not current   [] Not current, but scheduled   [x] NA  Prostate [x] Current [] Not current   [] Not current, but scheduled   [] NA  PAP/Pelvic [] Current [] Not current   [] Not current, but scheduled   [x] NA  Skin  [] Current  [x] Not current   [] Not current, but scheduled   [] NA    Hormone:  Lupron [] Last dose given:           Next dose due:   Eligard []   Last dose given:           Next dose due:   Aromatase Inhibitors []   Medication name:   N/A:  [x]         FALLS RISK SCREEN  Instructions:  Assess the patient and enter the appropriate indicators that are present for fall risk identification. Total the numbers entered and assign a fall risk score from Table 2.  Reassess patient at a minimum every 12 weeks or with status change. Assessment   Date  1/16/2020     1. Mental Ability: confusion/cognitively impaired 0     2. Elimination Issues: incontinence, frequency 0       3. Ambulatory: use of assistive devices (walker, cane, off-loading devices),        attached to equipment (IV pole, oxygen) 0     4. Sensory Limitations: dizziness, vertigo, impaired vision 0     5. Age less than 65        0     6. Age 72 or greater 1     7. Medication: diuretics, strong analgesics, hypnotics, sedatives,        antihypertensive agents 0   8. Falls:  recent history of falls within the last 3 months (not to include slipping or        tripping) 0   TOTAL 1    If score of 4 or greater was education given? No           TABLE 2   Risk Score Risk Level Plan of Care   0-3 Little or  No Risk 1. Provide assistance as indicated for ambulation activities  2. Reorient confused/cognitively impaired patient  3. Chair/bed in low position, stretcher/bed with siderails up except when performing patient care activities  5. Educate patient/family/caregiver on falls prevention  6.  Reassess in 12 weeks or with any noted change in patient condition which places them at a risk for a fall   4-6 Moderate Risk 1. Provide assistance as indicated for ambulation activities  2. Reorient confused/cognitively impaired patient  3. Chair/bed in low position, stretcher/bed with siderails up except when performing patient care activities  4. Educate patient/family/caregiver on falls prevention     7 or   Higher High Risk 1.   Place patient in

## 2020-01-16 NOTE — FLOWSHEET NOTE
Situation:  Writer visited with Patient in the waiting area of the radiation oncology clinic. Assessment:  Mr. Alvin Michel was sitting in the waiting area of the radiation oncology clinic. He engaged in conversation with writer. He shared about his experience with illness and other life challenges. He identified his kyle as his source of support. 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 He shared his philosophy of coping with life and how he tries to encourage others. He expressed gratitude to God. He voiced belief that God is working through him to help encourage others. He offered empathy to fellow Patients. Intervention:  Writer provided supportive presence and explored Pt's coping and needs; inquired about Pt's sources of support and strength; facilitated story telling; affirmed Pt's strengths; gave Pt her business card and a spiritual care brochure as well as a copy of \"[employer]\"; informed Pt that she is available for support. Outcome:  Mr. Alvin Michel thanked writer for the support. 01/16/20 1251   Encounter Summary   Services provided to: Patient   Referral/Consult From: [address] Family members;Spouse; Anabaptism/kyle community   Continue Visiting   (1/16/20)   Complexity of Encounter Low   Length of Encounter 15 minutes   Spiritual Assessment Completed Yes   Spiritual/Zoroastrian   Type Spiritual support   Assessment Calm; Approachable;Coping; Hopeful   Intervention Active listening;Explored feelings, thoughts, concerns;Explored coping resources;Provided reading materials/devotional materials;Sustaining presence/ Ministry of presence; Discussed relationship with God;Discussed belief system/Congregation practices/kyle;Discussed meaning/purpose;Discussed illness/injury and it's impact   Outcome Receptive; Hopeful;Encouraged;Coping;Expressed feelings/needs/concerns;Engaged in conversation;Expressed gratitude     Electronically signed by Trinidad Blue, Oncology Outpatient [facility], [address] [facility] Radiation Oncology  1/16/2020  12:52 PM

## 2020-07-16 ENCOUNTER — HOSPITAL ENCOUNTER (OUTPATIENT)
Dept: RADIATION ONCOLOGY | Age: 75
Discharge: HOME | End: 2020-07-16
Attending: RADIOLOGY
Payer: MEDICARE

## 2020-07-16 VITALS
HEART RATE: 51 BPM | OXYGEN SATURATION: 97 % | RESPIRATION RATE: 18 BRPM | DIASTOLIC BLOOD PRESSURE: 78 MMHG | SYSTOLIC BLOOD PRESSURE: 156 MMHG | WEIGHT: 212 LBS | TEMPERATURE: 97 F | BODY MASS INDEX: 28.75 KG/M2

## 2020-07-16 LAB — PROSTATE SPECIFIC ANTIGEN: 0.51 UG/L

## 2020-07-16 PROCEDURE — 99213 OFFICE O/P EST LOW 20 MIN: CPT | Performed by: RADIOLOGY

## 2020-07-16 PROCEDURE — 36415 COLL VENOUS BLD VENIPUNCTURE: CPT

## 2020-07-16 PROCEDURE — 84153 ASSAY OF PSA TOTAL: CPT

## 2020-07-16 PROCEDURE — 99212 OFFICE O/P EST SF 10 MIN: CPT | Performed by: RADIOLOGY

## 2020-07-16 RX ORDER — LEUPROLIDE ACETATE 7.5 MG
1 KIT INTRAMUSCULAR
COMMUNITY

## 2020-07-16 RX ORDER — APALUTAMIDE 60 MG/1
60 TABLET, FILM COATED ORAL DAILY
COMMUNITY
Start: 2020-06-16

## 2020-07-16 NOTE — PROGRESS NOTES
Americo Garcia Imes  7/16/2020  9:06 AM      Vitals:    07/16/20 0900   BP: (!) 156/78   Pulse: 51   Resp: 18   Temp: 97 °F (36.1 °C)   SpO2: 97%    :  Patient Currently in Pain: Denies             Wt Readings from Last 1 Encounters:   07/16/20 212 lb (96.2 kg)                Current Outpatient Medications:     leuprolide (LUPRON DEPOT, 1-MONTH,) 7.5 MG injection, Inject 1 applicator into the muscle every 6 months, Disp: , Rfl:     ERLEADA 60 MG TABS, Take 60 mg by mouth daily 4 tabs daily, Disp: , Rfl:     losartan (COZAAR) 25 MG tablet, take 1 tablet by mouth once daily, Disp: 90 tablet, Rfl: 3    clopidogrel (PLAVIX) 75 MG tablet, take 1 tablet by mouth once daily, Disp: 90 tablet, Rfl: 3    vitamin E 400 UNIT capsule, Take 600 Units by mouth daily Indications: took first dose yesterday, Disp: , Rfl:     carvedilol (COREG) 3.125 MG tablet, Take 1 tablet by mouth 2 times daily, Disp: 180 tablet, Rfl: 3    amLODIPine (NORVASC) 5 MG tablet, Take 1 tablet by mouth daily, Disp: 90 tablet, Rfl: 3    rosuvastatin (CRESTOR) 5 MG tablet, take 1 tablet by mouth once daily, Disp: 90 tablet, Rfl: 3    Calcium-Magnesium 200-50 MG TABS, Take 3 tablets by mouth daily, Disp: , Rfl:     Coenzyme Q10 (EQL COQ10) 300 MG CAPS, Take by mouth, Disp: , Rfl:     Cholecalciferol (VITAMIN D3) 42979 UNITS CAPS, Take by mouth, Disp: , Rfl:     aspirin 81 MG tablet, Take 81 mg by mouth daily, Disp: , Rfl:     Immunizations:    Influenza status:    [x]   Current   []   Patient declined    Pneumococcal status:  [x]   Current  []   Patient declined    Smoking Status:    [] Smoker - PPD:  Smoking cessation education: Provided []   Declined []    [] Nonsmoker - Quit Date:               [x] Never a smoker           Cancer Screening:  Colonoscopy [] Current [x] Not current   [] Not current, but scheduled   [] NA  Mammogram [] Current [x] Not current   [] Not current, but scheduled   [] NA  Prostate [x] Current [] Not current   [] Not current, but scheduled   [] NA  PAP/Pelvic [] Current [] Not current   [] Not current, but scheduled   [x] NA  Skin  [] Current  [x] Not current   [] Not current, but scheduled   [] NA    Hormone:  Lupron [x]   Last dose given:           Next dose due:   Eligard []   Last dose given:           Next dose due:   Aromatase Inhibitors []   Medication name:   N/A:  []           FALLS RISK SCREEN  Instructions:  Assess the patient and enter the appropriate indicators that are present for fall risk identification. Total the numbers entered and assign a fall risk score from Table 2.  Reassess patient at a minimum every 12 weeks or with status change. Assessment   Date  7/16/2020     1. Mental Ability: confusion/cognitively impaired 0     2. Elimination Issues: incontinence, frequency 0       3. Ambulatory: use of assistive devices (walker, cane, off-loading devices),        attached to equipment (IV pole, oxygen) 0     4. Sensory Limitations: dizziness, vertigo, impaired vision 0     5. Age less than 65        0     6. Age 72 or greater 1     7. Medication: diuretics, strong analgesics, hypnotics, sedatives,        antihypertensive agents 3   8. Falls:  recent history of falls within the last 3 months (not to include slipping or        tripping) 0   TOTAL 4    If score of 4 or greater was education given? Yes           TABLE 2   Risk Score Risk Level Plan of Care   0-3 Little or  No Risk 1. Provide assistance as indicated for ambulation activities  2. Reorient confused/cognitively impaired patient  3. Chair/bed in low position, stretcher/bed with siderails up except when performing patient care activities  5. Educate patient/family/caregiver on falls prevention  6.  Reassess in 12 weeks or with any noted change in patient condition which places them at a risk for a fall   4-6 Moderate Risk 1. Provide assistance as indicated for ambulation activities  2. Reorient confused/cognitively impaired patient  3. Chair/bed in low position, stretcher/bed with siderails up except when performing patient care activities  4. Educate patient/family/caregiver on falls prevention     7 or   Higher High Risk 1. Place patient in easily observable treatment room  2. Patient attended at all times by family member or staff  3. Provide assistance as indicated for ambulation activities  4. Reorient confused/cognitively impaired patient  5. Chair/bed in low position, stretcher/bed with siderails up except when performing patient care activities  6. Educate patient/family/caregiver on falls prevention         PLAN: Patient is seen today in follow up. He reports loose/soft BMs since finishing radiation. He also reports incontinence of urine at times. Wears 1 pad per day. Dr Marvel Bunn notified and examined pt.  Pt to f/u in 6 months with RO Marge Gottron

## 2020-07-17 NOTE — PROGRESS NOTES
[address]            Radiation Oncology          [address] [facility], [address]        Melissa Meraz: [phone]        F: [phone]       [email or web address]         Date of Service: 2020     Location:  [address],   [address] [facility], [place]   [phone]        RADIATION ONCOLOGY FOLLOW UP NOTE    Patient ID:   Cas Millan  : 1945   MRN: [id number]    DIAGNOSIS:  High risk prostate adenocarcinoma s/p RP in 2014 at [facility] showing pT3b, pN1.  Patient received hormonal therapy after his surgery, however his PSA increased to 6.03. [facility] had a recent Axumin scan which showed FDG avidity in the prostatic bed fossa with no evidence of distant metastatic disease. Patient completed a course of salvage radiotherapy with concurrent androgen deprivation therapy. He received 5040 cGy to the pelvic lymph nodes, 6660 cGy to the prostatic fossa, 7740 cGy to the FDG avid Axumin nodule in the prostate fossa completing 19. He is currently on long term ADT with Med Onc and Apalutamide. INTERVAL HISTORY:   Mr Daryl Gutierrez is a 64-year-old gentleman who comes in today for follow-up visit approximately 7 months after completion of his salvage radiation therapy. Overall patient is doing well with no acute complaints. He denies any urinary symptoms of dysuria, hematuria, urgency, or incontinence. Patient does still continue to wear 1 pad a day just in case he does have occasional stress incontinence. He denies having any bowel symptoms of diarrhea or rectal bleeding or pain. Patient otherwise continues to be on ADT and apalutamide with Dr. Dion Dubose and is tolerating it well with no acute symptoms. 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   He denies any other acute symptoms of headaches, dizziness, chest pain, abdominal pain, nausea, shortness of breath, diarrhea, bony pain, or any bleeding. He had a recent PSA done on 2020 which has continued to decline down to 0.51 now. AUA Score: 1    MEDICATIONS:    Current Outpatient Medications:     leuprolide (LUPRON DEPOT, 1-MONTH,) 7.5 MG injection, Inject 1 applicator into the muscle every 6 months, Disp: , Rfl:     ERLEADA 60 MG TABS, Take 60 mg by mouth daily 4 tabs daily, Disp: , Rfl:     losartan (COZAAR) 25 MG tablet, take 1 tablet by mouth once daily, Disp: 90 tablet, Rfl: 3    clopidogrel (PLAVIX) 75 MG tablet, take 1 tablet by mouth once daily, Disp: 90 tablet, Rfl: 3    vitamin E 400 UNIT capsule, Take 600 Units by mouth daily Indications: took first dose yesterday, Disp: , Rfl:     carvedilol (COREG) 3.125 MG tablet, Take 1 tablet by mouth 2 times daily, Disp: 180 tablet, Rfl: 3    amLODIPine (NORVASC) 5 MG tablet, Take 1 tablet by mouth daily, Disp: 90 tablet, Rfl: 3    rosuvastatin (CRESTOR) 5 MG tablet, take 1 tablet by mouth once daily, Disp: 90 tablet, Rfl: 3    Calcium-Magnesium 200-50 MG TABS, Take 3 tablets by mouth daily, Disp: , Rfl:     Coenzyme Q10 (EQL COQ10) 300 MG CAPS, Take by mouth, Disp: , Rfl:     Cholecalciferol (VITAMIN D3) 83211 UNITS CAPS, Take by mouth, Disp: , Rfl:     aspirin 81 MG tablet, Take 81 mg by mouth daily, Disp: , Rfl:     ALLERGIES:  Allergies   Allergen Reactions    Lipitor [Atorvastatin] Other (See Comments)     Myalgias and arthralgias     Penicillins          REVIEW OF SYSTEMS:    A 14 point review of system was performed and is negative except as mentioned above    PHYSICAL EXAMINATION:    ECO Asymptomatic    VITAL SIGNS: BP (!) 156/78   Pulse 51   Temp 97 °F (36.1 °C)   Resp 18   Wt 212 lb (96.2 kg)   SpO2 97%   BMI 28.75 kg/m²   GENERAL:  General appearance is that of a well-nourished, well-developed in no apparent distress. HEENT: Normocephalic, atraumatic, EOMI, moist mucosa, no erythema.   NECK:  No adenopathy or a palpable thyroid mass, trachea is midline. HEART:  Regular rate and rhythm, S1, S2, no murmurs. LUNGS:  Clear to auscultation bilaterally with no wheezing or crackles. ABDOMEN:  Soft, nontender, non distended. EXTREMITIES:  No clubbing, cyanosis, or edema. No calf tenderness. MSK:  No CVA or spinal tenderness. NEUROLOGICAL: No focal deficits. CN II-XII intact. Strength and sensation intact bilaterally. SKIN: No erythema or desquamation. RECTAL: Deferred (low PSA). LABS:  Platelets   Date Value Ref Range Status   09/10/2019 262 150 - 450 k/uL Final     2/17/20 PSA 0.62  PSA   Date Value Ref Range Status   07/16/2020 0.51 <4.1 ug/L Final     Comment:     The Roche \"ECLIA\" assay is used. Results obtained with different assay methods cannot be   used interchangeably. IMAGING:   None      ASSESSMENT AND PLAN:  Mr Dilia Buitrago is a 66year old gentleman with high risk prostate adenocarcinoma s/p RP in 2014 at [facility] showing pT3b, pN1.  Patient received hormonal therapy after his surgery, however his PSA increased to 6.03. Lorraine Luna had a recent Axumin scan which showed FDG avidity in the prostatic bed fossa with no evidence of distant metastatic disease. Patient completed a course of salvage radiotherapy with concurrent androgen deprivation therapy. He received 5040 cGy to the pelvic lymph nodes, 6660 cGy to the prostatic fossa, 7740 cGy to the FDG avid Axumin nodule in the prostate fossa completing 12/6/19. He is currently on long term ADT with Med Onc and Apalutamide. Patient has recovered well from his radiation treatments with no residual toxicities in regards to the bowel or bladder function, nor does he have any fatigue. He will continue following up with medical oncology regarding systemic therapy with Lupron and apalutamide. His PSA continues to decline and will continue to be followed by medical oncology, who he will be seeing for his next injection later this month.  He will come back to see us in the radiation oncology clinic in 6 months or sooner if clinically indicated. Patient was in agreement with my recommendations. All questions were answered to his satisfaction. Patient was advised to contact us anytime should he have any questions or concerns.      Electronically signed by Liss Kaminski MD on 7/17/2020 at 3:12 PM        Medications Prescribed:   New Prescriptions    No medications on file       Orders:   Orders Placed This Encounter   Procedures    PSA    PSA, Diagnostic       CC:  Patient Care Team:  Gordon Gandhi MD as PCP - General (Hematology and Oncology)

## 2020-09-25 PROBLEM — I65.23 BILATERAL CAROTID ARTERY STENOSIS: Status: ACTIVE | Noted: 2020-09-25

## 2020-09-25 PROBLEM — Z79.02 ENCOUNTER FOR CURRENT LONG TERM USE OF ANTIPLATELET DRUG: Status: ACTIVE | Noted: 2020-09-25

## 2021-01-13 ENCOUNTER — HOSPITAL ENCOUNTER (OUTPATIENT)
Age: 76
Discharge: HOME | End: 2021-01-13
Attending: RADIOLOGY
Payer: MEDICARE

## 2021-01-13 LAB — PROSTATE SPECIFIC ANTIGEN: 0.85 UG/L

## 2021-01-13 PROCEDURE — 36415 COLL VENOUS BLD VENIPUNCTURE: CPT

## 2021-01-13 PROCEDURE — 84153 ASSAY OF PSA TOTAL: CPT

## 2021-01-21 ENCOUNTER — HOSPITAL ENCOUNTER (OUTPATIENT)
Dept: RADIATION ONCOLOGY | Age: 76
Discharge: HOME | End: 2021-01-21
Attending: RADIOLOGY
Payer: MEDICARE

## 2021-01-21 VITALS
HEART RATE: 55 BPM | OXYGEN SATURATION: 97 % | DIASTOLIC BLOOD PRESSURE: 69 MMHG | RESPIRATION RATE: 14 BRPM | BODY MASS INDEX: 28.79 KG/M2 | SYSTOLIC BLOOD PRESSURE: 159 MMHG | WEIGHT: 212.3 LBS | TEMPERATURE: 98 F

## 2021-01-21 PROCEDURE — 99213 OFFICE O/P EST LOW 20 MIN: CPT | Performed by: RADIOLOGY

## 2021-01-21 PROCEDURE — 99212 OFFICE O/P EST SF 10 MIN: CPT | Performed by: RADIOLOGY

## 2021-01-21 NOTE — PROGRESS NOTES
Radha Buck Imvamshi  1/21/2021  10:25 AM      Vitals:    01/21/21 1015   BP: (!) 159/69   Pulse: 55   Resp: 14   Temp: 98 °F (36.7 °C)   SpO2: 97%    :  Patient Currently in Pain: Denies             Wt Readings from Last 1 Encounters:   01/21/21 212 lb 4.8 oz (96.3 kg)                Current Outpatient Medications:     amLODIPine (NORVASC) 5 MG tablet, Take 1 tablet by mouth daily, Disp: 90 tablet, Rfl: 3    losartan (COZAAR) 25 MG tablet, Take 1 tablet by mouth daily, Disp: 90 tablet, Rfl: 3    rosuvastatin (CRESTOR) 5 MG tablet, take 1 tablet by mouth once daily, Disp: 90 tablet, Rfl: 3    clopidogrel (PLAVIX) 75 MG tablet, Take 1 tablet by mouth daily, Disp: 90 tablet, Rfl: 3    leuprolide (LUPRON DEPOT, 1-MONTH,) 7.5 MG injection, Inject 1 applicator into the muscle every 6 months, Disp: , Rfl:     ERLEADA 60 MG TABS, Take 60 mg by mouth daily 4 tabs daily, Disp: , Rfl:     vitamin E 400 UNIT capsule, Take 600 Units by mouth daily Indications: took first dose yesterday, Disp: , Rfl:     carvedilol (COREG) 3.125 MG tablet, Take 1 tablet by mouth 2 times daily, Disp: 180 tablet, Rfl: 3    Calcium-Magnesium 200-50 MG TABS, Take 3 tablets by mouth daily, Disp: , Rfl:     Coenzyme Q10 (EQL COQ10) 300 MG CAPS, Take 200 mg by mouth , Disp: , Rfl:     Cholecalciferol (VITAMIN D3) 69991 UNITS CAPS, Take by mouth, Disp: , Rfl:     aspirin 81 MG tablet, Take 81 mg by mouth daily, Disp: , Rfl:     Immunizations:    Influenza status:    [x]   Current   []   Patient declined    Pneumococcal status:  [x]   Current  []   Patient declined    Smoking Status:    [] Smoker - PPD:  Smoking cessation education: Provided []   Declined []    [] Nonsmoker - Quit Date:               [x] Never a smoker           Cancer Screening:  Colonoscopy [x] Current [] Not current   [] Not current, but scheduled   [] NA  Mammogram [] Current [x] Not current   [] Not current, but scheduled   [] NA  Prostate [x] Current [] Not current   [] Not patient  3. Chair/bed in low position, stretcher/bed with siderails up except when performing patient care activities  4. Educate patient/family/caregiver on falls prevention     7 or   Higher High Risk 1. Place patient in easily observable treatment room  2. Patient attended at all times by family member or staff  3. Provide assistance as indicated for ambulation activities  4. Reorient confused/cognitively impaired patient  5. Chair/bed in low position, stretcher/bed with siderails up except when performing patient care activities  6. Educate patient/family/caregiver on falls prevention         PLAN: Patient is seen today in follow up. He reports no concerns at this time. Dr Burr Screen notified and examined pt. Pt to have repeat imaging and f/u in 3 months.          Mary Vazquez

## 2021-01-22 NOTE — PROGRESS NOTES
[facility]            Radiation Oncology          [address] [facility]          [place] [address], [address]        Melisa Batch: [phone]        F: [phone]       [email or web address]         Date of Service: 2021     Location:  [address],   [address] [facility]   [phone]        RADIATION ONCOLOGY FOLLOW UP NOTE    Patient ID:   Tremaine Strong  : 1945   MRN: [id number]    DIAGNOSIS:  High risk prostate adenocarcinoma s/p RP in  at [facility] showing pT3b, pN1.  Patient received hormonal therapy after his surgery, however his PSA increased to 6.03. Tahir Zelaya had a recent Axumin scan which showed FDG avidity in the prostatic bed fossa with no evidence of distant metastatic disease. Patient completed a course of salvage radiotherapy with concurrent androgen deprivation therapy. He received 5040 cGy to the pelvic lymph nodes, 6660 cGy to the prostatic fossa, 7740 cGy to the FDG avid Axumin nodule in the prostate fossa completing 19. He is currently on long term ADT with Eligard and Apalutamide. INTERVAL HISTORY:   Mr Milana Long is a 67-year-old gentleman who comes in today for follow-up visit approximately 1 year after completion of his salvage radiation therapy. Overall patient is doing well with no acute complaints. He denies any urinary symptoms of dysuria, hematuria, urgency, or incontinence. Patient does still continue to wear 1 pad a day just in case he does have occasional stress incontinence. He denies having any bowel symptoms of diarrhea or rectal bleeding or pain. Patient otherwise continues to be on Eligard and apalutamide with Dr. Zacarias Bañuelos and is tolerating it well with no acute symptoms. 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   He denies any other acute symptoms of headaches, dizziness, chest pain, abdominal pain, nausea, shortness of breath, diarrhea, bony pain, or any bleeding. He had a recent PSA done on 2021 is slightly up at 0.85. Patient however feels that this is due to his blood test being done at [address] [facility] as opposed to [place] [address] where he typically goes.     AUA Score: 2    MEDICATIONS:    Current Outpatient Medications:     amLODIPine (NORVASC) 5 MG tablet, Take 1 tablet by mouth daily, Disp: 90 tablet, Rfl: 3    losartan (COZAAR) 25 MG tablet, Take 1 tablet by mouth daily, Disp: 90 tablet, Rfl: 3    rosuvastatin (CRESTOR) 5 MG tablet, take 1 tablet by mouth once daily, Disp: 90 tablet, Rfl: 3    clopidogrel (PLAVIX) 75 MG tablet, Take 1 tablet by mouth daily, Disp: 90 tablet, Rfl: 3    leuprolide (LUPRON DEPOT, 1-MONTH,) 7.5 MG injection, Inject 1 applicator into the muscle every 6 months, Disp: , Rfl:     ERLEADA 60 MG TABS, Take 60 mg by mouth daily 4 tabs daily, Disp: , Rfl:     vitamin E 400 UNIT capsule, Take 600 Units by mouth daily Indications: took first dose yesterday, Disp: , Rfl:     carvedilol (COREG) 3.125 MG tablet, Take 1 tablet by mouth 2 times daily, Disp: 180 tablet, Rfl: 3    Calcium-Magnesium 200-50 MG TABS, Take 3 tablets by mouth daily, Disp: , Rfl:     Coenzyme Q10 (EQL COQ10) 300 MG CAPS, Take 200 mg by mouth , Disp: , Rfl:     Cholecalciferol (VITAMIN D3) 76289 UNITS CAPS, Take by mouth, Disp: , Rfl:     aspirin 81 MG tablet, Take 81 mg by mouth daily, Disp: , Rfl:     ALLERGIES:  Allergies   Allergen Reactions    Lipitor [Atorvastatin] Other (See Comments)     Myalgias and arthralgias     Penicillins          REVIEW OF SYSTEMS:    A 14 point review of system was performed and is negative except as mentioned above    PHYSICAL EXAMINATION:    ECO Asymptomatic    VITAL SIGNS: BP (!) 159/69   Pulse 55   Temp 98 °F (36.7 °C)   Resp 14   Wt 212 lb 4.8 oz (96.3 kg)   SpO2 97%   BMI 28.79 kg/m²   GENERAL:  General appearance is that of a well-nourished, well-developed in no apparent distress. HEENT: Normocephalic, atraumatic, EOMI, moist mucosa, no erythema. NECK:  No adenopathy or a palpable thyroid mass, trachea is midline. HEART:  Regular rate and rhythm, S1, S2, no murmurs. LUNGS:  Clear to auscultation bilaterally with no wheezing or crackles. ABDOMEN:  Soft, nontender, non distended. EXTREMITIES:  No clubbing, cyanosis, or edema. No calf tenderness. MSK:  No CVA or spinal tenderness. NEUROLOGICAL: No focal deficits. CN II-XII intact. Strength and sensation intact bilaterally. SKIN: No erythema or desquamation. RECTAL: Deferred. LABS:  Platelets   Date Value Ref Range Status   09/10/2019 262 150 - 450 k/uL Final       PSA   Date Value Ref Range Status   01/13/2021 0.85 <4.1 ug/L Final     Comment:     The Roche \"ECLIA\" assay is used. Results obtained with different assay methods cannot be   used interchangeably. 07/16/2020 0.51 <4.1 ug/L Final     Comment:     The Roche \"ECLIA\" assay is used. Results obtained with different assay methods cannot be   used interchangeably. 2/17/20 PSA 0.62 (Outside records scanned into Media folder)      IMAGING:   None      ASSESSMENT AND PLAN:  Mr Kelby Walton is a 66year old gentleman with high risk prostate adenocarcinoma s/p RP in 2014 at [facility] showing pT3b, pN1.  Patient received hormonal therapy after his surgery, however his PSA increased to 6.03. Manju  had a recent Axumin scan which showed FDG avidity in the prostatic bed fossa with no evidence of distant metastatic disease. Patient completed a course of salvage radiotherapy with concurrent androgen deprivation therapy. He received 5040 cGy to the pelvic lymph nodes, 6660 cGy to the prostatic fossa, 7740 cGy to the FDG avid Axumin nodule in the prostate fossa completing 12/6/19. He is currently on long term ADT with Eligard and Apalutamide. Patient in today for a follow-up visit approximately 1 year after completing his pathology radiation therapy.   He denies any symptoms with his bowel or bladder function. He continues to tolerate systemic treatments well with Eligard and apalutamide. He does follow with Dr. Zacarias Bañuelos will be seeing next month with a repeat PSA done at [facility].  Patient would prefer to see with the results of that blood test as before discussing any changes in his current treatment regimen as he is tolerating it well and still able to be very active. In the meantime patient is recommended to continue on his current regimen of treatment. Recommend patient come back to see us in the radiation oncology clinic in 6 months or sooner if clinically indicated. Patient was in agreement with my recommendations. All questions were answered to his satisfaction. Patient was advised to contact us anytime should he have any questions or concerns. Electronically signed by Deepa Allred MD on 1/22/2021 at 2:02 PM        Medications Prescribed:   New Prescriptions    No medications on file       Orders:   No orders of the defined types were placed in this encounter.       CC:  Patient Care Team:  Jason Harris MD as PCP - General (Hematology and Oncology)

## 2021-04-22 ENCOUNTER — TELEPHONE (OUTPATIENT)
Dept: RADIATION ONCOLOGY | Age: 76
End: 2021-04-22

## 2021-04-22 NOTE — TELEPHONE ENCOUNTER
Patient did not show for appointment today writer called patient he states he is currently in [place] and forgot to call to cancel. Patient states he will c/b to reschedule once he is back in town.

## 2021-06-28 ENCOUNTER — HOSPITAL ENCOUNTER (OUTPATIENT)
Dept: RADIATION ONCOLOGY | Age: 76
Discharge: HOME | End: 2021-06-28
Attending: RADIOLOGY
Payer: MEDICARE

## 2021-06-28 VITALS
RESPIRATION RATE: 14 BRPM | HEART RATE: 97 BPM | DIASTOLIC BLOOD PRESSURE: 76 MMHG | TEMPERATURE: 98 F | SYSTOLIC BLOOD PRESSURE: 147 MMHG | OXYGEN SATURATION: 98 %

## 2021-06-28 PROCEDURE — 99212 OFFICE O/P EST SF 10 MIN: CPT | Performed by: RADIOLOGY

## 2021-06-28 PROCEDURE — 99213 OFFICE O/P EST LOW 20 MIN: CPT | Performed by: RADIOLOGY

## 2021-06-28 NOTE — PROGRESS NOTES
Jarrod Elmore Imes  6/28/2021  2:55 PM      Vitals:    06/28/21 1445   BP: (!) 147/76   Pulse: 97   Resp: 14   Temp: 98 °F (36.7 °C)   SpO2: 98%    :  Patient Currently in Pain: Denies             Wt Readings from Last 1 Encounters:   01/21/21 212 lb 4.8 oz (96.3 kg)                Current Outpatient Medications:     amLODIPine (NORVASC) 5 MG tablet, Take 1 tablet by mouth daily, Disp: 90 tablet, Rfl: 3    losartan (COZAAR) 25 MG tablet, Take 1 tablet by mouth daily, Disp: 90 tablet, Rfl: 3    rosuvastatin (CRESTOR) 5 MG tablet, take 1 tablet by mouth once daily, Disp: 90 tablet, Rfl: 3    clopidogrel (PLAVIX) 75 MG tablet, Take 1 tablet by mouth daily, Disp: 90 tablet, Rfl: 3    leuprolide (LUPRON DEPOT, 1-MONTH,) 7.5 MG injection, Inject 1 applicator into the muscle every 6 months, Disp: , Rfl:     ERLEADA 60 MG TABS, Take 60 mg by mouth daily 4 tabs daily, Disp: , Rfl:     vitamin E 400 UNIT capsule, Take 600 Units by mouth daily Indications: took first dose yesterday, Disp: , Rfl:     carvedilol (COREG) 3.125 MG tablet, Take 1 tablet by mouth 2 times daily, Disp: 180 tablet, Rfl: 3    Calcium-Magnesium 200-50 MG TABS, Take 3 tablets by mouth daily, Disp: , Rfl:     Coenzyme Q10 (EQL COQ10) 300 MG CAPS, Take 200 mg by mouth , Disp: , Rfl:     Cholecalciferol (VITAMIN D3) 99064 UNITS CAPS, Take by mouth, Disp: , Rfl:     aspirin 81 MG tablet, Take 81 mg by mouth daily, Disp: , Rfl:     Immunizations:    Influenza status:    []   Current   [x]   Patient declined    Pneumococcal status:  []   Current  [x]   Patient declined  Covid status:   []  Dose #1:                     []  Dose #2:               [x]   Patient declined    Smoking Status:    [] Smoker - PPD:   [] Nonsmoker - Quit Date:               [x] Never a smoker      Cancer Screening:  Colonoscopy   [x] Current       [] Not current   [] Not current, but scheduled   [] NA  Mammogram   [] Current       [x] Not current   [] Not current, but scheduled [] NA  Prostate           [x] Current       [] Not current   [] Not current, but scheduled   [] NA  PAP/Pelvic      [] Current       [] Not current   [] Not current, but scheduled   [x] NA  Skin                 [] Current       [x] Not current   [] Not current, but scheduled   [] NA     Hormone:  Lupron []   Last dose given:           Next dose due:   Eligard []   Last dose given:           Next dose due:   Aromatase Inhibitors []   Medication name:   N/A:  [x]             *BREAST Patient only:    Lymphedema Eval:   [] left arm      [] right arm  Location:     Measurement (cm)    Upper Bicep :    Lower Bicep :         FALLS RISK SCREEN  Instructions:  Assess the patient and enter the appropriate indicators that are present for fall risk identification. Total the numbers entered and assign a fall risk score from Table 2.  Reassess patient at a minimum every 12 weeks or with status change. Assessment   Date  6/28/2021     1. Mental Ability: confusion/cognitively impaired 0     2. Elimination Issues: incontinence, frequency 0       3. Ambulatory: use of assistive devices (walker, cane, off-loading devices),        attached to equipment (IV pole, oxygen) 0     4. Sensory Limitations: dizziness, vertigo, impaired vision 0     5. Age less than 65        0     6. Age 72 or greater 1     7. Medication: diuretics, strong analgesics, hypnotics, sedatives,        antihypertensive agents 3   8. Falls:  recent history of falls within the last 3 months (not to include slipping or        tripping) 0   TOTAL 4    If score of 4 or greater was education given? Yes           TABLE 2   Risk Score Risk Level Plan of Care   0-3 Little or  No Risk 1. Provide assistance as indicated for ambulation activities  2. Reorient confused/cognitively impaired patient  3. Chair/bed in low position, stretcher/bed with siderails up except when performing patient care activities  5.   Educate patient/family/caregiver on falls prevention  6.  Reassess in 12 weeks or with any noted change in patient condition which places them at a risk for a fall   4-6 Moderate Risk 1. Provide assistance as indicated for ambulation activities  2. Reorient confused/cognitively impaired patient  3. Chair/bed in low position, stretcher/bed with siderails up except when performing patient care activities  4. Educate patient/family/caregiver on falls prevention     7 or   Higher High Risk 1. Place patient in easily observable treatment room  2. Patient attended at all times by family member or staff  3. Provide assistance as indicated for ambulation activities  4. Reorient confused/cognitively impaired patient  5. Chair/bed in low position, stretcher/bed with siderails up except when performing patient care activities  6. Educate patient/family/caregiver on falls prevention         PLAN: Patient is seen today in follow up. He reports no concerns at this time. Dr Theresa Banks notified and examined pt. Pt to f/u with RO in 3 months.          Sanna Sanchez RN

## 2021-07-01 NOTE — PROGRESS NOTES
[facility] [address]            Radiation Oncology          [address] [facility] [place]          [address], [address]        Jm Mcmahon: [phone]        F: [phone]       [email or web address]         Date of Service: 2021     Location:  [facility],   [address] [facility] [place]   [phone]        RADIATION ONCOLOGY FOLLOW UP NOTE    Patient ID:   Leatha Edwards  : 1945   MRN: [id number]    DIAGNOSIS:  High risk prostate adenocarcinoma s/p RP in  at [facility] showing pT3b, pN1.  Patient received hormonal therapy after his surgery, however his PSA increased to 6.03. Luciano Rolle had a recent Axumin scan which showed FDG avidity in the prostatic bed fossa with no evidence of distant metastatic disease. Patient completed a course of salvage radiotherapy with concurrent androgen deprivation therapy. He received 5040 cGy to the pelvic lymph nodes, 6660 cGy to the prostatic fossa, 7740 cGy to the FDG avid Axumin nodule in the prostate fossa completing 19. He is currently on long term ADT with Eligard and Apalutamide. INTERVAL HISTORY:   Mr Rohini Pan is a 70-year-old gentleman who comes in today for follow-up visit approximately 1 and half years after completion of his salvage radiation therapy. Overall patient is doing well with no acute complaints. He denies any urinary symptoms of dysuria, hematuria, urgency, or incontinence. Patient does still continue to wear 1 pad a day just in case he does have occasional stress incontinence. He denies having any bowel symptoms of diarrhea or rectal bleeding or pain. Patient otherwise continues to be on Eligard and apalutamide with Dr. Shu Escobar and is tolerating it well with no acute symptoms. 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   He denies any other acute symptoms of headaches, dizziness, chest pain, abdominal pain, nausea, shortness of breath, diarrhea, bony pain, or any bleeding. He however has been having an increase rise in his PSA noting 0.85 in January now up to 2.04 in May. Patient had a Axumin PET scan done at [facility] clinic which was negative for any metastatic disease. Patient therefore has been referred to [facility] for consideration of PSMA PET scan and lutetium PSMA treatment as a part of a potential clinical trial.  Patient is still awaiting approval and scheduling of the testing.        AUA Score: 2    MEDICATIONS:    Current Outpatient Medications:     amLODIPine (NORVASC) 5 MG tablet, Take 1 tablet by mouth daily, Disp: 90 tablet, Rfl: 3    losartan (COZAAR) 25 MG tablet, Take 1 tablet by mouth daily, Disp: 90 tablet, Rfl: 3    rosuvastatin (CRESTOR) 5 MG tablet, take 1 tablet by mouth once daily, Disp: 90 tablet, Rfl: 3    clopidogrel (PLAVIX) 75 MG tablet, Take 1 tablet by mouth daily, Disp: 90 tablet, Rfl: 3    leuprolide (LUPRON DEPOT, 1-MONTH,) 7.5 MG injection, Inject 1 applicator into the muscle every 6 months, Disp: , Rfl:     ERLEADA 60 MG TABS, Take 60 mg by mouth daily 4 tabs daily, Disp: , Rfl:     vitamin E 400 UNIT capsule, Take 600 Units by mouth daily Indications: took first dose yesterday, Disp: , Rfl:     carvedilol (COREG) 3.125 MG tablet, Take 1 tablet by mouth 2 times daily, Disp: 180 tablet, Rfl: 3    Calcium-Magnesium 200-50 MG TABS, Take 3 tablets by mouth daily, Disp: , Rfl:     Coenzyme Q10 (EQL COQ10) 300 MG CAPS, Take 200 mg by mouth , Disp: , Rfl:     Cholecalciferol (VITAMIN D3) 06405 UNITS CAPS, Take by mouth, Disp: , Rfl:     aspirin 81 MG tablet, Take 81 mg by mouth daily, Disp: , Rfl:     ALLERGIES:  Allergies   Allergen Reactions    Lipitor [Atorvastatin] Other (See Comments)     Myalgias and arthralgias     Penicillins          REVIEW OF SYSTEMS:    A 14 point review of system was performed and is negative except as mentioned above    PHYSICAL EXAMINATION:    ECO Asymptomatic    VITAL SIGNS: BP (!) 147/76   Pulse 97   Temp 98 °F (36.7 °C)   Resp 14   SpO2 98%   GENERAL:  General appearance is that of a well-nourished, well-developed in no apparent distress. HEENT: Normocephalic, atraumatic, EOMI, moist mucosa, no erythema. NECK:  No adenopathy or a palpable thyroid mass, trachea is midline. HEART:  Regular rate and rhythm, S1, S2, no murmurs. LUNGS:  Clear to auscultation bilaterally with no wheezing or crackles. ABDOMEN:  Soft, nontender, non distended. EXTREMITIES:  No clubbing, cyanosis, or edema. No calf tenderness. MSK:  No CVA or spinal tenderness. NEUROLOGICAL: No focal deficits. CN II-XII intact. Strength and sensation intact bilaterally. SKIN: No erythema or desquamation. RECTAL: Deferred. LABS:  Platelets   Date Value Ref Range Status   09/10/2019 262 150 - 450 k/uL Final       PSA   Date Value Ref Range Status   01/13/2021 0.85 <4.1 ug/L Final     Comment:     The Roche \"ECLIA\" assay is used. Results obtained with different assay methods cannot be   used interchangeably. 07/16/2020 0.51 <4.1 ug/L Final     Comment:     The Roche \"ECLIA\" assay is used. Results obtained with different assay methods cannot be   used interchangeably. 2/17/20 PSA 0.62 (Outside records scanned into Media folder)  3/15/21 PSA 1.21 (Outside records scanned into Media folder)  5/24/21 PSA 2.04 (Outside records scanned into Media folder)      IMAGING:   Anjel Montez PET in March at [facility] - Negative (per Laci Sidhu note 5/28/21)      ASSESSMENT AND PLAN:  Mr Ann Rao is a 66year old gentleman with high risk prostate adenocarcinoma s/p RP in 2014 at [facility] showing pT3b, pN1.  Patient received hormonal therapy after his surgery, however his PSA increased to 6.03. Shalini Russ had a recent Axumin scan which showed FDG avidity in the prostatic bed fossa with no evidence of distant metastatic disease.   Patient completed a course of salvage radiotherapy with concurrent androgen deprivation therapy. He received 5040 cGy to the pelvic lymph nodes, 6660 cGy to the prostatic fossa, 7740 cGy to the FDG avid Axumin nodule in the prostate fossa completing 12/6/19. He is currently on long term ADT with Eligard and Apalutamide. Patient in today for a follow-up visit approximately a year and half ago. Patient since then has been following closely and has been noted to have a rise in his PSA now up to 2.04 May 24,021. Patient did have a negative Axumin scan. Patient has been referred to [facility] for PSMA. We therefore encourage patient to continue on his current regiment with Dr. Toni Reyez and encouraged him to follow-up with [facility] for scheduling of his next imaging. Should he have any questions concerns or issues patient does have our contact information can certainly call us. We also encourage patient to maintain close follow-up for his routine PSA blood work and to send us the results after each test.      Recommend patient come back to see us in the radiation oncology clinic in 3 months or sooner if clinically indicated. Patient was in agreement with my recommendations. All questions were answered to his satisfaction. Patient was advised to contact us anytime should he have any questions or concerns. Electronically signed by Kalli Ross MD on 7/1/2021 at 9:40 AM        Medications Prescribed:   New Prescriptions    No medications on file       Orders:   No orders of the defined types were placed in this encounter.       CC:  Patient Care Team:  Marylee Bison, MD as PCP - General (Hematology and Oncology)

## 2021-10-18 ENCOUNTER — HOSPITAL ENCOUNTER (OUTPATIENT)
Dept: RADIATION ONCOLOGY | Age: 76
Discharge: HOME | End: 2021-10-18
Attending: RADIOLOGY
Payer: MEDICARE

## 2021-10-18 VITALS
WEIGHT: 215 LBS | RESPIRATION RATE: 14 BRPM | HEART RATE: 52 BPM | OXYGEN SATURATION: 98 % | DIASTOLIC BLOOD PRESSURE: 61 MMHG | BODY MASS INDEX: 29.16 KG/M2 | TEMPERATURE: 97.8 F | SYSTOLIC BLOOD PRESSURE: 103 MMHG

## 2021-10-18 PROCEDURE — 99213 OFFICE O/P EST LOW 20 MIN: CPT | Performed by: RADIOLOGY

## 2021-10-18 PROCEDURE — 99212 OFFICE O/P EST SF 10 MIN: CPT | Performed by: RADIOLOGY

## 2021-10-18 ASSESSMENT — PAIN SCALES - GENERAL: PAINLEVEL_OUTOF10: 0

## 2021-10-18 ASSESSMENT — PAIN DESCRIPTION - FREQUENCY: FREQUENCY: INTERMITTENT

## 2021-10-18 ASSESSMENT — PAIN DESCRIPTION - LOCATION: LOCATION: BACK

## 2021-10-18 ASSESSMENT — PAIN DESCRIPTION - ORIENTATION: ORIENTATION: UPPER;LEFT

## 2021-10-18 NOTE — PROGRESS NOTES
Kimberly Hotter Imes  10/18/2021  3:36 PM      Vitals:    10/18/21 1530   BP: 103/61   Pulse: 52   Resp: 14   Temp: 97.8 °F (36.6 °C)   SpO2: 98%    :  Patient Currently in Pain: Yes  Pain Assessment: 0-10  Pain Level: 0       Wt Readings from Last 1 Encounters:   10/18/21 215 lb (97.5 kg)                Current Outpatient Medications:     amLODIPine (NORVASC) 5 MG tablet, Take 1 tablet by mouth daily, Disp: 90 tablet, Rfl: 3    losartan (COZAAR) 25 MG tablet, Take 1 tablet by mouth daily, Disp: 90 tablet, Rfl: 3    rosuvastatin (CRESTOR) 5 MG tablet, take 1 tablet by mouth once daily, Disp: 90 tablet, Rfl: 3    clopidogrel (PLAVIX) 75 MG tablet, Take 1 tablet by mouth daily, Disp: 90 tablet, Rfl: 3    leuprolide (LUPRON DEPOT, 1-MONTH,) 7.5 MG injection, Inject 1 applicator into the muscle every 6 months, Disp: , Rfl:     ERLEADA 60 MG TABS, Take 60 mg by mouth daily 4 tabs daily, Disp: , Rfl:     vitamin E 400 UNIT capsule, Take 600 Units by mouth daily Indications: took first dose yesterday, Disp: , Rfl:     carvedilol (COREG) 3.125 MG tablet, Take 1 tablet by mouth 2 times daily, Disp: 180 tablet, Rfl: 3    Calcium-Magnesium 200-50 MG TABS, Take 3 tablets by mouth daily, Disp: , Rfl:     Coenzyme Q10 (EQL COQ10) 300 MG CAPS, Take 200 mg by mouth , Disp: , Rfl:     Cholecalciferol (VITAMIN D3) 17986 UNITS CAPS, Take by mouth, Disp: , Rfl:     aspirin 81 MG tablet, Take 81 mg by mouth daily, Disp: , Rfl:     Immunizations:    Influenza status:    []   Current   [x]   Patient declined    Pneumococcal status:  []   Current  [x]   Patient declined  Covid status:   []  Dose #1:                     []  Dose #2:               [x]   Patient declined    Smoking Status:    [] Smoker - PPD:   [] Nonsmoker - Quit Date:               [x] Never a smoker      Cancer Screening:  Colonoscopy   [] Current       [x] Not current   [] Not current, but scheduled   [] NA  Mammogram   [] Current       [x] Not current   [] Not current, but scheduled   [] NA  Prostate           [x] Current       [] Not current   [] Not current, but scheduled   [] NA  PAP/Pelvic      [] Current       [] Not current   [] Not current, but scheduled   [x] NA  Skin                 [] Current       [x] Not current   [] Not current, but scheduled   [] NA     Hormone:  Lupron []   Last dose given:           Next dose due:   Eligard []   Last dose given:           Next dose due:   Aromatase Inhibitors []   Medication name:   N/A:  [x]                 FALLS RISK SCREEN  Instructions:  Assess the patient and enter the appropriate indicators that are present for fall risk identification. Total the numbers entered and assign a fall risk score from Table 2.  Reassess patient at a minimum every 12 weeks or with status change. Assessment   Date  10/18/2021     1. Mental Ability: confusion/cognitively impaired 0     2. Elimination Issues: incontinence, frequency 0       3. Ambulatory: use of assistive devices (walker, cane, off-loading devices),        attached to equipment (IV pole, oxygen) 0     4. Sensory Limitations: dizziness, vertigo, impaired vision 0     5. Age less than 65        0     6. Age 72 or greater 1     7. Medication: diuretics, strong analgesics, hypnotics, sedatives,        antihypertensive agents 3   8. Falls:  recent history of falls within the last 3 months (not to include slipping or        tripping) 0   TOTAL 4    If score of 4 or greater was education given? Yes           TABLE 2   Risk Score Risk Level Plan of Care   0-3 Little or  No Risk 1. Provide assistance as indicated for ambulation activities  2. Reorient confused/cognitively impaired patient  3. Chair/bed in low position, stretcher/bed with siderails up except when performing patient care activities  5.   Educate patient/family/caregiver on falls prevention  6.  Reassess in 12 weeks or with any noted change in patient condition which places them at a risk for a fall   4-6 Moderate Risk 1. Provide assistance as indicated for ambulation activities  2. Reorient confused/cognitively impaired patient  3. Chair/bed in low position, stretcher/bed with siderails up except when performing patient care activities  4. Educate patient/family/caregiver on falls prevention     7 or   Higher High Risk 1. Place patient in easily observable treatment room  2. Patient attended at all times by family member or staff  3. Provide assistance as indicated for ambulation activities  4. Reorient confused/cognitively impaired patient  5. Chair/bed in low position, stretcher/bed with siderails up except when performing patient care activities  6. Educate patient/family/caregiver on falls prevention         PLAN: Patient is seen today in follow up. Dr Julia Coreas notified and examined pt. Pt to f/u with in 3 months with JACOB Cardona RN

## 2021-10-19 NOTE — PROGRESS NOTES
[facility]            [facility]cology          [address] [facility]          [address], [address]        [facility]: [phone]        F: [phone]       [email or web address]         Date of Service: 10/18/2021     Location:  [address],   [address] [facility] [place]   [phone]        RADIATION ONCOLOGY FOLLOW UP NOTE    Patient ID:   Indigo Conley  : 1945   MRN: [id number]    DIAGNOSIS:  High risk prostate adenocarcinoma s/p RP in  at [facility] showing pT3b, pN1.  Patient received hormonal therapy after his surgery, however his PSA increased to 6.03. [facility] had a recent Axumin scan which showed FDG avidity in the prostatic bed fossa with no evidence of distant metastatic disease. Patient completed a course of salvage radiotherapy with concurrent androgen deprivation therapy. He received 5040 cGy to the pelvic lymph nodes, 6660 cGy to the prostatic fossa, 7740 cGy to the FDG avid Axumin nodule in the prostate fossa completing 19. He is currently on long term ADT with Eligard and Apalutamide. He has had a rise in his PSA up to >4, and is now planning for Provenge    INTERVAL HISTORY:   Mr Iliana Rollins is a 27-year-old gentleman who comes in today for follow-up visit approximately 22 months after completion of his salvage radiation therapy. Overall patient is doing well with no acute complaints. He denies any urinary symptoms of dysuria, hematuria, urgency, or incontinence. Patient does still continue to wear 1 pad a day just in case he does have occasional stress incontinence as he is very active. He denies having any bowel symptoms of diarrhea or rectal bleeding or pain.   Patient was noted to have a rise in his PSA earlier this year and was referred by Dr. Fiona Taylor to [facility] where he was evaluated by Dr. Yesenia Alvarado where he did have a PSM a PET scan done on 2021 showing uptake in the pelvic lymph nodes as well as osseous mg by mouth daily, Disp: , Rfl:     ALLERGIES:  Allergies   Allergen Reactions    Lipitor [Atorvastatin] Other (See Comments)     Myalgias and arthralgias     Penicillins          REVIEW OF SYSTEMS:    A 14 point review of system was performed and is negative except as mentioned above    PHYSICAL EXAMINATION:    ECO Asymptomatic    VITAL SIGNS: /61   Pulse 52   Temp 97.8 °F (36.6 °C)   Resp 14   Wt 215 lb (97.5 kg)   SpO2 98%   BMI 29.16 kg/m²   GENERAL:  General appearance is that of a well-nourished, well-developed in no apparent distress. HEENT: Normocephalic, atraumatic, EOMI, moist mucosa, no erythema. NECK:  No adenopathy or a palpable thyroid mass, trachea is midline. HEART:  Regular rate and rhythm, S1, S2, no murmurs. LUNGS:  Clear to auscultation bilaterally with no wheezing or crackles. ABDOMEN:  Soft, nontender, non distended. EXTREMITIES:  No clubbing, cyanosis, or edema. No calf tenderness. MSK:  No CVA or spinal tenderness. NEUROLOGICAL: No focal deficits. CN II-XII intact. Strength and sensation intact bilaterally. SKIN: No erythema or desquamation. RECTAL: Deferred. LABS:  Platelets   Date Value Ref Range Status   09/10/2019 262 150 - 450 k/uL Final       PSA   Date Value Ref Range Status   2021 0.85 <4.1 ug/L Final     Comment:     The Roche \"ECLIA\" assay is used. Results obtained with different assay methods cannot be   used interchangeably. 2020 0.51 <4.1 ug/L Final     Comment:     The Roche \"ECLIA\" assay is used. Results obtained with different assay methods cannot be   used interchangeably.      20 PSA 0.62 (Outside records scanned into Media folder)  3/15/21 PSA 1.21 (Outside records scanned into Media folder)  21 PSA 2.04 (Outside records scanned into Media folder)      IMAGING:   Phill Layer PET in March at [facility] (Outside records scanned into Media folder)  - Negative (per Rose Crews note 21)  PSA PET 21 at [facility] (Outside records scanned into Media folder)  - Uptake in pelvic lymph nodes anterior rectum and osseous structures    ASSESSMENT AND PLAN:  Mr Laray Mortimer is a 64year old gentleman with high risk prostate adenocarcinoma s/p RP in 2014 at [facility] showing pT3b, pN1.  Patient received hormonal therapy after his surgery, however his PSA increased to 6.03. [facility] had a recent Axumin scan which showed FDG avidity in the prostatic bed fossa with no evidence of distant metastatic disease. Patient completed a course of salvage radiotherapy with concurrent androgen deprivation therapy. He received 5040 cGy to the pelvic lymph nodes, 6660 cGy to the prostatic fossa, 7740 cGy to the FDG avid Axumin nodule in the prostate fossa completing 12/6/19. He is currently on long term ADT with Eligard and Apalutamide. Patient in today for a follow-up visit approximately 2 years after his salvage radiation therapy. Patient has been found through [address] [place] [address] PET scan at [facility] to have metastatic disease. Patient's PSA has continued to rise and therefore he is encouraged to proceed on with Provenge therapy. Patient will be getting this through at [facility] with Dr. Kirk Santoro and encouraged him to follow-up with [facility] for scheduling of his next imaging. Should he have any questions concerns or issues patient does have our contact information can certainly call us. We also encourage patient to maintain close follow-up for his routine PSA blood work and to send us the results after each test.      Recommend patient come back to see us in the radiation oncology clinic in 3 months or sooner if clinically indicated. Patient was in agreement with my recommendations. All questions were answered to his satisfaction. Patient was advised to contact us anytime should he have any questions or concerns.      Electronically signed by Robin Rico MD on 10/19/2021 at 9:07 AM        Medications Prescribed:   New Prescriptions    No medications on file Orders:   No orders of the defined types were placed in this encounter.       CC:  Patient Care Team:  Staci vAilez MD as PCP - General (Hematology and Oncology)

## 2022-01-11 DIAGNOSIS — C61 PROSTATE CANCER (HCC): Primary | ICD-10-CM

## 2022-01-14 DIAGNOSIS — C61 PROSTATE CANCER (HCC): ICD-10-CM

## 2022-01-17 ENCOUNTER — HOSPITAL ENCOUNTER (OUTPATIENT)
Dept: RADIATION ONCOLOGY | Age: 77
Discharge: HOME | End: 2022-01-17
Attending: RADIOLOGY
Payer: MEDICARE

## 2022-01-17 VITALS
BODY MASS INDEX: 29.81 KG/M2 | OXYGEN SATURATION: 96 % | DIASTOLIC BLOOD PRESSURE: 79 MMHG | WEIGHT: 219.8 LBS | HEART RATE: 61 BPM | TEMPERATURE: 96.6 F | RESPIRATION RATE: 18 BRPM | SYSTOLIC BLOOD PRESSURE: 150 MMHG

## 2022-01-17 DIAGNOSIS — C61 PROSTATE CANCER (HCC): Primary | ICD-10-CM

## 2022-01-17 PROCEDURE — 99211 OFF/OP EST MAY X REQ PHY/QHP: CPT | Performed by: RADIOLOGY

## 2022-01-17 PROCEDURE — 99213 OFFICE O/P EST LOW 20 MIN: CPT | Performed by: RADIOLOGY

## 2022-01-17 ASSESSMENT — PAIN DESCRIPTION - PAIN TYPE: TYPE: ACUTE PAIN

## 2022-01-17 ASSESSMENT — PAIN DESCRIPTION - LOCATION: LOCATION: BACK

## 2022-01-17 ASSESSMENT — PAIN SCALES - GENERAL: PAINLEVEL_OUTOF10: 2

## 2022-01-17 NOTE — PROGRESS NOTES
Salvatore Leary Imes  1/17/2022  3:35 PM      Vitals:    01/17/22 1530   BP: (!) 150/79   Pulse: 61   Resp: 18   Temp: 96.6 °F (35.9 °C)   SpO2: 96%    :  Patient Currently in Pain: Yes  Pain Assessment: 0-10  Pain Level: 2       Wt Readings from Last 1 Encounters:   01/17/22 219 lb 12.8 oz (99.7 kg)                Current Outpatient Medications:     amLODIPine (NORVASC) 5 MG tablet, Take 1 tablet by mouth daily, Disp: 90 tablet, Rfl: 3    losartan (COZAAR) 25 MG tablet, Take 1 tablet by mouth daily, Disp: 90 tablet, Rfl: 3    rosuvastatin (CRESTOR) 5 MG tablet, take 1 tablet by mouth once daily, Disp: 90 tablet, Rfl: 3    clopidogrel (PLAVIX) 75 MG tablet, Take 1 tablet by mouth daily, Disp: 90 tablet, Rfl: 3    leuprolide (LUPRON DEPOT, 1-MONTH,) 7.5 MG injection, Inject 1 applicator into the muscle every 6 months, Disp: , Rfl:     ERLEADA 60 MG TABS, Take 60 mg by mouth daily 4 tabs daily, Disp: , Rfl:     vitamin E 400 UNIT capsule, Take 600 Units by mouth daily Indications: took first dose yesterday, Disp: , Rfl:     carvedilol (COREG) 3.125 MG tablet, Take 1 tablet by mouth 2 times daily, Disp: 180 tablet, Rfl: 3    Calcium-Magnesium 200-50 MG TABS, Take 3 tablets by mouth daily, Disp: , Rfl:     Coenzyme Q10 (EQL COQ10) 300 MG CAPS, Take 200 mg by mouth , Disp: , Rfl:     Cholecalciferol (VITAMIN D3) 19028 UNITS CAPS, Take by mouth, Disp: , Rfl:     aspirin 81 MG tablet, Take 81 mg by mouth daily, Disp: , Rfl:     Immunizations:    Influenza status:    []   Current   [x]   Patient declined    Pneumococcal status:  []   Current  [x]   Patient declined  Covid status:   []  Dose #1:                     []  Dose #2:               [x]   Patient declined    Smoking Status:    [] Smoker - PPD:   [] Nonsmoker - Quit Date:               [x] Never a smoker      Cancer Screening:  Colonoscopy   [x] Current       [] Not current   [] Not current, but scheduled   [] NA  Mammogram   [] Current       [x] Not current [] Not current, but scheduled   [] NA  Prostate           [x] Current       [] Not current   [] Not current, but scheduled   [] NA  PAP/Pelvic      [] Current       [] Not current   [] Not current, but scheduled   [x] NA  Skin                 [] Current       [x] Not current   [] Not current, but scheduled   [] NA     Hormone:  Lupron [x]   Last dose given:             Next dose due:  q 3 months   Eligard []   Last dose given:           Next dose due:   Aromatase Inhibitors []   Medication name:   N/A:  []           FALLS RISK SCREEN  Instructions:  Assess the patient and enter the appropriate indicators that are present for fall risk identification. Total the numbers entered and assign a fall risk score from Table 2.  Reassess patient at a minimum every 12 weeks or with status change. Assessment   Date  1/17/2022     1. Mental Ability: confusion/cognitively impaired 0     2. Elimination Issues: incontinence, frequency 0       3. Ambulatory: use of assistive devices (walker, cane, off-loading devices),        attached to equipment (IV pole, oxygen) 0     4. Sensory Limitations: dizziness, vertigo, impaired vision 0     5. Age less than 65        0     6. Age 72 or greater 1     7. Medication: diuretics, strong analgesics, hypnotics, sedatives,        antihypertensive agents 3   8. Falls:  recent history of falls within the last 3 months (not to include slipping or        tripping) 0   TOTAL 4    If score of 4 or greater was education given? No           TABLE 2   Risk Score Risk Level Plan of Care   0-3 Little or  No Risk 1. Provide assistance as indicated for ambulation activities  2. Reorient confused/cognitively impaired patient  3. Chair/bed in low position, stretcher/bed with siderails up except when performing patient care activities  5.   Educate patient/family/caregiver on falls prevention  6.  Reassess in 12 weeks or with any noted change in patient condition which places them at a risk for a fall   4-6 Moderate Risk 1. Provide assistance as indicated for ambulation activities  2. Reorient confused/cognitively impaired patient  3. Chair/bed in low position, stretcher/bed with siderails up except when performing patient care activities  4. Educate patient/family/caregiver on falls prevention     7 or   Higher High Risk 1. Place patient in easily observable treatment room  2. Patient attended at all times by family member or staff  3. Provide assistance as indicated for ambulation activities  4. Reorient confused/cognitively impaired patient  5. Chair/bed in low position, stretcher/bed with siderails up except when performing patient care activities  6. Educate patient/family/caregiver on falls prevention         PLAN: Patient is seen today in follow up. Here to follow up on PSA results. Provenge given back in November 2021. Dr. Molina Enter updated updated and examined pt. Per MD pt will follow up with a PSA in 1 month and will MD appt in April after he returns from [place].           Rondall Harada, RN

## 2022-01-19 NOTE — PROGRESS NOTES
[facility]            Radiation Oncology          [address] [facility] [place], [address]        Elise Keepers: [phone]        F: [phone]       [email or web address]         Date of Service: 2022     Location:  [facility],   [address] [facility], [facility]   [phone]        RADIATION ONCOLOGY FOLLOW UP NOTE    Patient ID:   Cortney Leyva  : 1945   MRN: [id number]    DIAGNOSIS:  High risk prostate adenocarcinoma   -s/p RP in  at [facility] showing pT3b, pN1.    -s/p ADT after his surgery, however his PSA increased to 6.03.    -s/p Axumin scan avidity in the prostatic bed fossa     -s/p salvage RT + ADT 5040cGy to LNs, 6660 cGy to the prostatic fossa, 7740 cGy to the FDG avid Axumin nodule in the prostate fossa 19.    -s/p ADT Eligard and Apalutamide  -s/p Provenge. INTERVAL HISTORY:   Mr Daniela Clement is a 70-year-old gentleman who comes in today for follow-up visit approximately 2 years after completion of his salvage radiation therapy. Overall patient is doing well with no acute complaints. He denies any urinary symptoms of dysuria, hematuria, urgency, or incontinence. Patient does still continue to wear 1 pad a day just in case he does have occasional stress incontinence as he is very active. He denies having any bowel symptoms of diarrhea or rectal bleeding or pain. He recently completed a course of Provenge in November, and had a follow up PET scan on 21 that showed no activity in the previously active lymph nodes, and had stable osseous metastases. He had a follow up PSA on 22 that has subsequently risen up to 7.0. He has not seen Dr Tank Vu since his scans to review his results. However he denies any symptoms of headaches, chest pain, SOB, abdominal pain, N/V/D, swelling, bony pain, or bleeding. He does have some occasional back pain but notes it's related to work activity.      AUA Score: NA    MEDICATIONS:    Current Outpatient Medications:     amLODIPine (NORVASC) 5 MG tablet, Take 1 tablet by mouth daily, Disp: 90 tablet, Rfl: 3    losartan (COZAAR) 25 MG tablet, Take 1 tablet by mouth daily, Disp: 90 tablet, Rfl: 3    rosuvastatin (CRESTOR) 5 MG tablet, take 1 tablet by mouth once daily, Disp: 90 tablet, Rfl: 3    clopidogrel (PLAVIX) 75 MG tablet, Take 1 tablet by mouth daily, Disp: 90 tablet, Rfl: 3    leuprolide (LUPRON DEPOT, 1-MONTH,) 7.5 MG injection, Inject 1 applicator into the muscle every 6 months, Disp: , Rfl:     ERLEADA 60 MG TABS, Take 60 mg by mouth daily 4 tabs daily, Disp: , Rfl:     vitamin E 400 UNIT capsule, Take 600 Units by mouth daily Indications: took first dose yesterday, Disp: , Rfl:     carvedilol (COREG) 3.125 MG tablet, Take 1 tablet by mouth 2 times daily, Disp: 180 tablet, Rfl: 3    Calcium-Magnesium 200-50 MG TABS, Take 3 tablets by mouth daily, Disp: , Rfl:     Coenzyme Q10 (EQL COQ10) 300 MG CAPS, Take 200 mg by mouth , Disp: , Rfl:     Cholecalciferol (VITAMIN D3) 05119 UNITS CAPS, Take by mouth, Disp: , Rfl:     aspirin 81 MG tablet, Take 81 mg by mouth daily, Disp: , Rfl:     ALLERGIES:  Allergies   Allergen Reactions    Lipitor [Atorvastatin] Other (See Comments)     Myalgias and arthralgias     Penicillins          REVIEW OF SYSTEMS:    A 14 point review of system was performed and is negative except as mentioned above    PHYSICAL EXAMINATION:    ECO Asymptomatic    VITAL SIGNS: BP (!) 150/79   Pulse 61   Temp 96.6 °F (35.9 °C) (Temporal)   Resp 18   Wt 219 lb 12.8 oz (99.7 kg)   SpO2 96%   BMI 29.81 kg/m²   GENERAL:  General appearance is that of a well-nourished, well-developed in no apparent distress. HEENT: Normocephalic, atraumatic, EOMI, moist mucosa, no erythema. NECK:  No adenopathy or a palpable thyroid mass, trachea is midline. HEART:  Regular rate and rhythm, S1, S2, no murmurs.    LUNGS:  Clear to auscultation bilaterally with no wheezing or crackles. ABDOMEN:  Soft, nontender, non distended. EXTREMITIES:  No clubbing, cyanosis, or edema. No calf tenderness. MSK:  No CVA or spinal tenderness. NEUROLOGICAL: No focal deficits. CN II-XII intact. Strength and sensation intact bilaterally. SKIN: No erythema or desquamation. RECTAL: Deferred. LABS:  Platelets   Date Value Ref Range Status   09/10/2019 262 150 - 450 k/uL Final       PSA   Date Value Ref Range Status   01/13/2021 0.85 <4.1 ug/L Final     Comment:     The Roche \"ECLIA\" assay is used. Results obtained with different assay methods cannot be   used interchangeably. 07/16/2020 0.51 <4.1 ug/L Final     Comment:     The Roche \"ECLIA\" assay is used. Results obtained with different assay methods cannot be   used interchangeably. 2/17/20 PSA 0.62 (Outside records scanned into Media folder)  3/15/21 PSA 1.21 (Outside records scanned into Media folder)  5/24/21 PSA 2.04 (Outside records scanned into Media folder)  9/14/21 PSA 3.04 (Outside records scanned into Media folder)  1/13/22 PSA 7.0 (Outside records scanned into Media folder)    IMAGING:   Manju Mishra PET 03/2021 at [facility] (Outside records scanned into Media folder)  - Negative (per Ann Recio note 5/28/21)    PSA PET 7/19/21 at [address] (Outside records scanned into Media folder)  - Uptake in pelvic lymph nodes anterior rectum and osseous structures    Axumin PET 12/18/21 at [facility] (Outside records scanned into Media folder)  No change in osseous metastatic disease  No activity in the pelvic lymph nodes    ASSESSMENT AND PLAN:  Mr Homero James is a 64year old gentleman with high risk prostate adenocarcinoma s/p RP in 2014 at [facility] showing pT3b, pN1.  Patient received hormonal therapy after his surgery, however his PSA increased to 6.03. Whitney Pichardo had a recent Axumin scan which showed FDG avidity in the prostatic bed fossa with no evidence of distant metastatic disease.   Patient completed a course of salvage radiotherapy with concurrent androgen deprivation therapy. He received 5040 cGy to the pelvic lymph nodes, 6660 cGy to the prostatic fossa, 7740 cGy to the FDG avid Axumin nodule in the prostate fossa completing 12/6/19. He is s/p Apalutamide, and s/p Provenge. Patient in today for a follow-up visit approximately 2 years after his salvage radiation therapy. Patient has been found through [address] [place] [address] PET scan at [facility] to have metastatic disease, and was recommended to have Provenge which he completed in November. His Axumin PET scan afterwards showed a favorable response however his PSA has continued to rise. We discussed that the treatment may not necessary impact his PSA, but should help control his disease progression. We recommend he follow up with Dr. Myrna Lauren before he goes down to [place] in March, and recommend he schedule a follow-up with [facility] as well to review options for clinical trials or PSMA based treatment. Should he have any questions concerns or issues patient does have our contact information can certainly call us. We also encourage patient to maintain close follow-up for his routine PSA blood work and to send us the results after each test.      Recommend patient come back to see us in the radiation oncology clinic in 3 months or sooner if clinically indicated. Patient was in agreement with my recommendations. All questions were answered to his satisfaction. Patient was advised to contact us anytime should he have any questions or concerns. Electronically signed by Toyin Durand MD on 1/18/2022 at 9:06 PM        Medications Prescribed:   New Prescriptions    No medications on file       Orders:   Orders Placed This Encounter   Procedures    PSA, Diagnostic       CC:  Patient Care Team:  Magalie Keen MD as PCP - General (Hematology and Oncology)     Total time spent on this case on the day of encounter is more than 30 minutes.  This time includes combination of one or more of the following - review of necessary tests, review of pertinent medical records from the EMR, performing medically appropriate examination and evaluation, counseling and educating the patient/family/caregiver, ordering necessary medical tests, procedures etc., documenting the clinical information in the electronic medical record, care coordination, referring and communicating with other health care providers and interpretation of results independently. This note is created with the assistance of a speech recognition program.  While intending to generate a document that actually reflects the content of the visit, the document can still have some errors including those of syntax and sound a like substitutions which may escape proof reading. It such instances, actual meaning can be extrapolated by contextual diversion.

## 2022-04-13 DIAGNOSIS — C61 PROSTATE CANCER (HCC): ICD-10-CM

## 2022-04-18 ENCOUNTER — TELEPHONE (OUTPATIENT)
Dept: RADIATION ONCOLOGY | Age: 77
End: 2022-04-18

## 2022-04-18 NOTE — TELEPHONE ENCOUNTER
I called pt to remind him of RO f/u tomorrow. Pt states he has to cancel as he is currently in rehab, after a 16-day stay at [facility] recently. Pt states \"my cancer is in my back\", he is having trouble walking and going to therapy currently. Pt states he will c/b to reschedule when he is out of rehab.

## 2022-04-19 ENCOUNTER — HOSPITAL ENCOUNTER (OUTPATIENT)
Dept: RADIATION ONCOLOGY | Age: 77
Discharge: HOME | End: 2022-04-19
Attending: RADIOLOGY

## 2022-06-03 ENCOUNTER — TELEPHONE (OUTPATIENT)
Dept: RADIATION ONCOLOGY | Age: 77
End: 2022-06-03

## 2022-06-03 NOTE — TELEPHONE ENCOUNTER
I called [facility] med/onc and got Dr. Chad Ortega note from 5/20/22. Scanned to media. Pt is still in rehab from his back surgery. Plan is to start chemo/ Xofigo. Will move pt out on pending for rad/onc follow up.